# Patient Record
Sex: MALE | Race: WHITE
[De-identification: names, ages, dates, MRNs, and addresses within clinical notes are randomized per-mention and may not be internally consistent; named-entity substitution may affect disease eponyms.]

---

## 2017-07-31 ENCOUNTER — HOSPITAL ENCOUNTER (INPATIENT)
Dept: HOSPITAL 74 - YASAS | Age: 52
LOS: 4 days | Discharge: HOME | DRG: 897 | End: 2017-08-04
Attending: INTERNAL MEDICINE | Admitting: INTERNAL MEDICINE
Payer: COMMERCIAL

## 2017-07-31 VITALS — BODY MASS INDEX: 24.7 KG/M2

## 2017-07-31 DIAGNOSIS — K21.9: ICD-10-CM

## 2017-07-31 DIAGNOSIS — Z87.891: ICD-10-CM

## 2017-07-31 DIAGNOSIS — R74.0: ICD-10-CM

## 2017-07-31 DIAGNOSIS — F10.230: ICD-10-CM

## 2017-07-31 DIAGNOSIS — F19.230: Primary | ICD-10-CM

## 2017-07-31 DIAGNOSIS — R00.0: ICD-10-CM

## 2017-07-31 DIAGNOSIS — F10.282: ICD-10-CM

## 2017-07-31 LAB
APPEARANCE UR: CLEAR
BILIRUB UR STRIP.AUTO-MCNC: NEGATIVE MG/DL
COLOR UR: (no result)
KETONES UR QL STRIP: NEGATIVE
LEUKOCYTE ESTERASE UR QL STRIP.AUTO: NEGATIVE
NITRITE UR QL STRIP: NEGATIVE
PH UR: 7 [PH] (ref 5–8)
PROT UR QL STRIP: NEGATIVE
PROT UR QL STRIP: NEGATIVE
RBC # BLD AUTO: <1 /HPF (ref 0–3)
RBC # UR STRIP: (no result) /UL
SP GR UR: <= 1.005 (ref 1–1.02)
UROBILINOGEN UR STRIP-MCNC: NEGATIVE MG/DL (ref 0.2–1)
WBC # UR AUTO: (no result) /HPF (ref 3–5)

## 2017-07-31 PROCEDURE — HZ2ZZZZ DETOXIFICATION SERVICES FOR SUBSTANCE ABUSE TREATMENT: ICD-10-PCS | Performed by: INTERNAL MEDICINE

## 2017-07-31 RX ADMIN — Medication SCH MG: at 22:19

## 2017-07-31 RX ADMIN — HYDROXYZINE PAMOATE PRN MG: 50 CAPSULE ORAL at 18:28

## 2017-07-31 RX ADMIN — RANITIDINE SCH MG: 150 TABLET ORAL at 22:19

## 2017-07-31 NOTE — CONSULT
[facility] Psychiatric Consult





- Data


Date of interview: 07/31/17


Admission source: [facility]


Identifying data: This is 51 years old Japanese speaking  male with no 

psychiatric hospitalization history, intoxicated with:  Alcohol, Cannabis


Substance Abuse History: Drug Screen Negative: No.  Urine Drug Screen Results: 

THC-Marijuana.  - Smoking Cessation.  Smoking history: Former smoker.  Have you 

smoked in the past 12 months: No.  Aproximately how many cigarettes per day: 1.

  Hx Chewing Tobacco Use: No.  Initiated information on smoking cessation: No.  

- Substance & Tx. History.  Hx Alcohol Use: Yes.  Hx Substance Use: No.  

Substance Use Type: Alcohol.  Hx Substance Use Treatment: Yes ([facility] detox 5 /2016)


Medical History: GERD


Psychiatric History: Patient reports no past psychiatric history


Physical/Sexual Abuse/Trauma History: Denies


Additional Comment: Drug Screen Negative: No.  Urine Drug Screen Results: THC-

Marijuana.  Observation.  Detox Unit Care Protocol





Mental Status Exam





- Mental Status Exam


Alert and Oriented to: Person


Cognitive Function: Fair


Patient Appearance: Unkempt


Mood: Apprehensive


Affect: Mood Congruent


Patient Behavior: Cooperative


Speech Pattern: Appropriate


Voice Loudness: Normal


Thought Process: Goal Oriented


Thought Disorder: Being Controlled


Hallucinations: Denies


Suicidal Ideation: Denies


Homicidal Ideation: Denies


Insight/Judgement: Fair


Sleep: Difficulty falling asleep


Appetite: Fair


Muscle strength/Tone: Normal


Gait/Station: Normal


Additional Comments: Observation.  Detox Unit Care Protocol





Psychiatric Findings





- Problem List (Axis 1, 2,3)


(1) Alcohol dependence with uncomplicated withdrawal


Current Visit: No   Status: Chronic





(2) GERD (gastroesophageal reflux disease)


Current Visit: No   Status: Chronic   Qualifiers: 


     Esophagitis presence: without esophagitis        Qualified Code(s): K21.9 

- Gastro-esophageal reflux disease without esophagitis  





(3) Alcohol-induced sleep disorder


Current Visit: Yes   Status: Acute








- Initial Treatment Plan


Initial Treatment Plan: Observation.  Detox Unit Care Protocol
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
## 2017-08-01 LAB
ALBUMIN SERPL-MCNC: 3.9 G/DL (ref 3.4–5)
ALP SERPL-CCNC: 162 U/L (ref 45–117)
ALT SERPL-CCNC: 105 U/L (ref 12–78)
ANION GAP SERPL CALC-SCNC: 13 MMOL/L (ref 8–16)
AST SERPL-CCNC: 287 U/L (ref 15–37)
BILIRUB SERPL-MCNC: 1 MG/DL (ref 0.2–1)
CALCIUM SERPL-MCNC: 8.5 MG/DL (ref 8.5–10.1)
CO2 SERPL-SCNC: 29 MMOL/L (ref 21–32)
CREAT SERPL-MCNC: 0.7 MG/DL (ref 0.7–1.3)
DEPRECATED RDW RBC AUTO: 16.2 % (ref 11.9–15.9)
GLUCOSE SERPL-MCNC: 123 MG/DL (ref 74–106)
MCH RBC QN AUTO: 30.1 PG (ref 25.7–33.7)
MCHC RBC AUTO-ENTMCNC: 32.6 G/DL (ref 32–35.9)
MCV RBC: 92.1 FL (ref 80–96)
PLATELET # BLD AUTO: 62 K/MM3 (ref 134–434)
PMV BLD: 9.4 FL (ref 7.5–11.1)
PROT SERPL-MCNC: 7.8 G/DL (ref 6.4–8.2)
WBC # BLD AUTO: 4.6 K/MM3 (ref 4–10)

## 2017-08-01 RX ADMIN — POTASSIUM CHLORIDE SCH MEQ: 1500 TABLET, EXTENDED RELEASE ORAL at 14:10

## 2017-08-01 RX ADMIN — RANITIDINE SCH MG: 150 TABLET ORAL at 22:17

## 2017-08-01 RX ADMIN — Medication SCH MG: at 22:17

## 2017-08-01 RX ADMIN — HYDROXYZINE PAMOATE PRN MG: 50 CAPSULE ORAL at 15:06

## 2017-08-01 RX ADMIN — LOPERAMIDE HYDROCHLORIDE PRN MG: 2 CAPSULE ORAL at 22:17

## 2017-08-01 RX ADMIN — RANITIDINE SCH MG: 150 TABLET ORAL at 10:07

## 2017-08-01 RX ADMIN — Medication SCH TAB: at 10:08

## 2017-08-01 RX ADMIN — HYDROXYZINE PAMOATE PRN MG: 50 CAPSULE ORAL at 02:30

## 2017-08-01 NOTE — EKG
Test Reason : 

Blood Pressure : ***/*** mmHG

Vent. Rate : 095 BPM     Atrial Rate : 095 BPM

   P-R Int : 154 ms          QRS Dur : 106 ms

    QT Int : 346 ms       P-R-T Axes : 032 024 059 degrees

   QTc Int : 434 ms

 

NORMAL SINUS RHYTHM

NORMAL ECG

WHEN COMPARED WITH ECG OF 16-JAN-2017 14:17,

NO SIGNIFICANT CHANGE WAS FOUND

Confirmed by SOCORRO FELIPE MD ([id number]) on 8/1/2017 2:24:18 PM

 

Referred By:             Confirmed By:SOCORRO FELIPE MD

## 2017-08-01 NOTE — PN
S CIWA





- CIWA Score


Nausea/Vomitin-No Nausea/No Vomiting


Muscle Tremors: 4-Moderate,w/Arms Extend


Anxiety: 3


Agitation: 4-Moderately Restless


Paroxysmal Sweats: 3


Orientation: 0-Oriented


Tacttile Disturbances: 0-None


Auditory Disturbances: 0-None


Visual Disturbances: 0-None


Headache: 2-Mild


CIWA-Ar Total Score: 16





[facility] Progress Note (SOAP)


Subjective: 


sweats


shakes


nausea


chills


headache


interrupted sleep


Objective: 





17 08:59


 Vital Signs











Temperature  97.7 F   17 06:00


 


Pulse Rate  93 H  17 06:30


 


Respiratory Rate  20   17 06:00


 


Blood Pressure  121/87   17 06:00


 


O2 Sat by Pulse Oximetry (%)      








 Laboratory Tests











  17





  14:00


 


Urine Color  Straw


 


Urine Appearance  Clear


 


Urine pH  7.0


 


Ur Specific Gravity  <= 1.005


 


Urine Protein  Negative


 


Urine Glucose (UA)  Negative


 


Urine Ketones  Negative


 


Urine Blood  1+ H


 


Urine Nitrite  Negative


 


Urine Bilirubin  Negative


 


Urine Urobilinogen  Negative


 


Ur Leukocyte Esterase  Negative


 


Urine RBC  <1


 


Urine WBC  None








labs pending


awake/alert


ambulating


no acute distress


Assessment: 





17 08:59


withdrawal sx


Plan: 


continue detox


increase fluids


labs pending


encouraged librium prn when necessary

## 2017-08-02 LAB
ALBUMIN SERPL-MCNC: 3.6 G/DL (ref 3.4–5)
ALP SERPL-CCNC: 207 U/L (ref 45–117)
ALT SERPL-CCNC: 225 U/L (ref 12–78)
ANION GAP SERPL CALC-SCNC: 8 MMOL/L (ref 8–16)
AST SERPL-CCNC: 676 U/L (ref 15–37)
BILIRUB SERPL-MCNC: 1.4 MG/DL (ref 0.2–1)
CALCIUM SERPL-MCNC: 9 MG/DL (ref 8.5–10.1)
CO2 SERPL-SCNC: 33 MMOL/L (ref 21–32)
CREAT SERPL-MCNC: 0.7 MG/DL (ref 0.7–1.3)
GLUCOSE SERPL-MCNC: 97 MG/DL (ref 74–106)
PROT SERPL-MCNC: 7.6 G/DL (ref 6.4–8.2)

## 2017-08-02 RX ADMIN — RANITIDINE SCH MG: 150 TABLET ORAL at 22:42

## 2017-08-02 RX ADMIN — POTASSIUM CHLORIDE SCH MEQ: 1500 TABLET, EXTENDED RELEASE ORAL at 10:17

## 2017-08-02 RX ADMIN — Medication SCH MG: at 22:42

## 2017-08-02 RX ADMIN — HYDROXYZINE PAMOATE PRN MG: 50 CAPSULE ORAL at 18:22

## 2017-08-02 RX ADMIN — QUETIAPINE FUMARATE SCH MG: 100 TABLET ORAL at 22:42

## 2017-08-02 RX ADMIN — Medication SCH TAB: at 10:17

## 2017-08-02 RX ADMIN — BACITRACIN ZINC SCH GM: 500 OINTMENT TOPICAL at 10:17

## 2017-08-02 RX ADMIN — LOPERAMIDE HYDROCHLORIDE PRN MG: 2 CAPSULE ORAL at 06:12

## 2017-08-02 RX ADMIN — IBUPROFEN PRN MG: 400 TABLET, FILM COATED ORAL at 16:21

## 2017-08-02 RX ADMIN — RANITIDINE SCH MG: 150 TABLET ORAL at 10:17

## 2017-08-02 RX ADMIN — HYDROXYZINE PAMOATE PRN MG: 50 CAPSULE ORAL at 14:13

## 2017-08-02 RX ADMIN — HYDROXYZINE PAMOATE PRN MG: 50 CAPSULE ORAL at 08:53

## 2017-08-02 NOTE — PN
Psychiatric Progress Note


Vital Signs: 


 Vital Signs











 Period  Temp  Pulse  Resp  BP Sys/Green  Pulse Ox


 


 Last 24 Hr  97.7 F-98.2 F    18-20  110-139/80-96  











Date of Session: 08/02/17


Chief Complaint:: Insomnia


HPI: Patient reports insomnia, reports good response on Seroquel 100mg po qhs


Current Medications: 


Active Medications











Generic Name Dose Route Start Last Admin





  Trade Name Freq  PRN Reason Stop Dose Admin


 


Al Hydroxide/Mg Hydroxide  30 ml 07/31/17 12:30  





  Mylanta Oral Suspension -  PO   





  Q6H PRN   





  DYSPEPSIA   


 


Artificial Tears  1 drop 08/01/17 13:30  





  Artificial Tears  OU   





  Q6H PRN   





  DRY EYES   


 


Bacitracin  0.9 gm 08/02/17 10:00 08/02/17 10:17





  Bacitracin -  TP   0.9 gm





  DAILY JAIME   Administration


 


Chlordiazepoxide HCl  10 mg 08/03/17 17:00  





  Librium -  PO 08/04/17 11:01  





  [id number] JAIME   


 


Chlordiazepoxide HCl  25 mg 07/31/17 12:30 08/02/17 01:09





  Librium -  PO 08/03/17 12:29  25 mg





  Q4H PRN   Administration





  WITHDRAWAL(CONT SUBST)   


 


Chlordiazepoxide HCl  15 mg 08/02/17 17:00  





  Librium -  PO 08/03/17 11:01  





  [id number] JAIME   


 


Diphenhydramine HCl  50 mg 07/31/17 12:30 08/02/17 00:31





  Benadryl -  PO   50 mg





  HSMR1 PRN   Administration





  INSOMNIA   


 


Eucalyptus/Menthol/Phenol/Sorbitol  1 each 07/31/17 12:30  





  Cepastat Lozenge -  MM   





  Q4H PRN   





  SORE THROAT   


 


Guaifenesin  10 ml 07/31/17 12:30  





  Robitussin Dm -  PO   





  Q6H PRN   





  COUGH   


 


Hydroxyzine Pamoate  50 mg 07/31/17 12:30 08/02/17 08:53





  Vistaril -  PO   50 mg





  Q4H PRN   Administration





  AGITATION   


 


Ibuprofen  400 mg 07/31/17 12:30  





  Motrin -  PO   





  Q6H PRN   





  SEVERE PAIN   


 


Loperamide HCl  4 mg 07/31/17 12:30 08/02/17 06:12





  Imodium -  PO   4 mg





  Q6H PRN   Administration





  DIARRHEA   


 


Magnesium Citrate  300 ml 07/31/17 12:30  





  Citroma -  PO   





  Q48H PRN   





  CONSTIPATION   


 


Magnesium Hydroxide  30 ml 07/31/17 12:30  





  Milk Of Magnesia -  PO   





  DAILY PRN   





  CONSTIPATION   


 


Potassium Chloride  40 meq 08/01/17 13:45 08/02/17 10:17





  K-Dur -  PO   40 meq





  DAILY JAIME   Administration


 


Prenatal Multivit/Folic Acid/Iron  1 tab 08/01/17 10:00 08/02/17 10:17





  Prenatal Vitamins (Sjr) -  PO   1 tab





  DAILY JAIME   Administration


 


Pseudoephedrine/Triprolidine  1 combo 07/31/17 12:30  





  Actifed -  PO   





  TID PRN   





  NASAL CONGESTION   


 


Quetiapine Fumarate  100 mg 08/02/17 22:00  





  Seroquel -  PO   





  HS JAIME   


 


Ranitidine HCl  150 mg 07/31/17 22:00 08/02/17 10:17





  Zantac -  PO   150 mg





  BID JAIME   Administration


 


Thiamine HCl  100 mg 07/31/17 22:00 08/01/17 22:17





  Vitamin B1 -  PO   100 mg





  HS JAIME   Administration














Mental Status Exam





- Mental Status Exam


Alert and Oriented to: Person


Cognitive Function: Fair


Patient Appearance: Well Groomed


Mood: Anxious


Affect: Mood Congruent


Patient Behavior: Talkative, Agitated


Speech Pattern: Excessive


Voice Loudness: Mildly Loud


Thought Process: Goal Oriented


Thought Disorder: Being Controlled


Hallucinations: Denies


Suicidal Ideation: Denies


Homicidal Ideation: Denies


Insight/Judgement: Fair


Sleep: Difficulty falling asleep


Appetite: Fair


Muscle strength/Tone: Normal


Gait/Station: Normal


Additional Comments: Seroquel 100mg po qhs





Psychiatric Treatment Plan





- Problem List


(1) Alcohol dependence with uncomplicated withdrawal


Current Visit: Yes





(2) GERD (gastroesophageal reflux disease)


Current Visit: Yes   Qualifiers: 


     Esophagitis presence: without esophagitis        Qualified Code(s): K21.9 

- Gastro-esophageal reflux disease without esophagitis  





(3) Alcohol-induced sleep disorder


Current Visit: Yes


Initial treatment plan: Seroquel 100mg po qhs

## 2017-08-02 NOTE — PN
[facility] CIWA





- CIWA Score


Nausea/Vomitin-No Nausea/No Vomiting


Muscle Tremors: 4-Moderate,w/Arms Extend


Anxiety: 3


Agitation: 4-Moderately Restless


Paroxysmal Sweats: 3


Orientation: 0-Oriented


Tacttile Disturbances: 0-None


Auditory Disturbances: 0-None


Visual Disturbances: 0-None


Headache: 0-None Present


CIWA-Ar Total Score: 14





[facility] Progress Note (SOAP)


Subjective: 


anxious


agitation


interrupted sleep


irritable


i would like my sutures removed


Objective: 


17 09:06


 Vital Signs











Temperature  97.7 F   17 06:28


 


Pulse Rate  109 H  17 06:28


 


Respiratory Rate  18   17 06:28


 


Blood Pressure  130/94   17 06:28


 


O2 Sat by Pulse Oximetry (%)      























 Laboratory Tests











  17





  14:00 06:00 06:00


 


WBC   4.6 


 


RBC   4.45 


 


Hgb   13.4 


 


Hct   40.9 


 


MCV   92.1 


 


MCH   30.1 


 


MCHC   32.6 


 


RDW   16.2 H 


 


Plt Count   62 L D 


 


MPV   9.4 


 


Sodium    138


 


Potassium    3.4 L


 


Chloride    96 L


 


Carbon Dioxide    29


 


Anion Gap    13


 


BUN    4 L D


 


Creatinine    0.7


 


Creat Clearance w eGFR    > 60


 


Random Glucose    123 H D


 


Calcium    8.5


 


Total Bilirubin    1.0


 


AST    287 H D


 


ALT    105 H


 


Alkaline Phosphatase    162 H D


 


Total Protein    7.8


 


Albumin    3.9


 


Urine Color  Straw  


 


Urine Appearance  Clear  


 


Urine pH  7.0  


 


Ur Specific Gravity  <= 1.005  


 


Urine Protein  Negative  


 


Urine Glucose (UA)  Negative  


 


Urine Ketones  Negative  


 


Urine Blood  1+ H  


 


Urine Nitrite  Negative  


 


Urine Bilirubin  Negative  


 


Urine Urobilinogen  Negative  


 


Ur Leukocyte Esterase  Negative  


 


Urine RBC  <1  


 


Urine WBC  None  


 


RPR Titer   














  17





  06:00


 


WBC 


 


RBC 


 


Hgb 


 


Hct 


 


MCV 


 


MCH 


 


MCHC 


 


RDW 


 


Plt Count 


 


MPV 


 


Sodium 


 


Potassium 


 


Chloride 


 


Carbon Dioxide 


 


Anion Gap 


 


BUN 


 


Creatinine 


 


Creat Clearance w eGFR 


 


Random Glucose 


 


Calcium 


 


Total Bilirubin 


 


AST 


 


ALT 


 


Alkaline Phosphatase 


 


Total Protein 


 


Albumin 


 


Urine Color 


 


Urine Appearance 


 


Urine pH 


 


Ur Specific Gravity 


 


Urine Protein 


 


Urine Glucose (UA) 


 


Urine Ketones 


 


Urine Blood 


 


Urine Nitrite 


 


Urine Bilirubin 


 


Urine Urobilinogen 


 


Ur Leukocyte Esterase 


 


Urine RBC 


 


Urine WBC 


 


RPR Titer  Nonreactive














awake/alert


ambulating


no acute distress


Assessment: 


17 10:03


withdrawal sx


suture site; clean, dry and healed. no s/s of infection


Plan: 


continue detox


increase fluids


vistirl prn


sutures removed; bacitracin ordered.

## 2017-08-03 LAB
ALT SERPL-CCNC: 215 U/L (ref 12–78)
AST SERPL-CCNC: 364 U/L (ref 15–37)
INR BLD: 0.98 (ref 0.82–1.09)
PT PNL PPP: 10.8 SEC (ref 9.98–11.88)

## 2017-08-03 RX ADMIN — Medication SCH TAB: at 11:04

## 2017-08-03 RX ADMIN — LACTULOSE SCH GM: 20 SOLUTION ORAL at 14:05

## 2017-08-03 RX ADMIN — RANITIDINE SCH MG: 150 TABLET ORAL at 22:31

## 2017-08-03 RX ADMIN — Medication SCH MG: at 22:31

## 2017-08-03 RX ADMIN — QUETIAPINE FUMARATE SCH MG: 100 TABLET ORAL at 22:31

## 2017-08-03 RX ADMIN — HYDROXYZINE PAMOATE PRN MG: 50 CAPSULE ORAL at 11:05

## 2017-08-03 RX ADMIN — IBUPROFEN PRN MG: 400 TABLET, FILM COATED ORAL at 13:54

## 2017-08-03 RX ADMIN — BACITRACIN ZINC SCH GM: 500 OINTMENT TOPICAL at 11:04

## 2017-08-03 RX ADMIN — LACTULOSE SCH GM: 20 SOLUTION ORAL at 17:19

## 2017-08-03 RX ADMIN — POLYVINYL ALCOHOL PRN DROP: 14 SOLUTION/ DROPS OPHTHALMIC at 22:32

## 2017-08-03 RX ADMIN — RANITIDINE SCH MG: 150 TABLET ORAL at 11:04

## 2017-08-03 RX ADMIN — POTASSIUM CHLORIDE SCH MEQ: 1500 TABLET, EXTENDED RELEASE ORAL at 11:04

## 2017-08-03 RX ADMIN — HYDROXYZINE PAMOATE PRN MG: 50 CAPSULE ORAL at 17:22

## 2017-08-03 RX ADMIN — LACTULOSE SCH GM: 20 SOLUTION ORAL at 22:32

## 2017-08-03 NOTE — PN
[facility] Progress Note (SOAP)


Subjective: 


anxiety


sweats


irritable


interrupted sleep


Objective: 


08/03/17 08:36


 Vital Signs











Temperature  98.1 F   08/03/17 06:14


 


Pulse Rate  89   08/03/17 06:14


 


Respiratory Rate  18   08/03/17 06:14


 


Blood Pressure  127/82   08/03/17 06:14


 


O2 Sat by Pulse Oximetry (%)      








 Laboratory Tests











  07/31/17 08/01/17 08/01/17





  14:00 06:00 06:00


 


WBC   4.6 


 


RBC   4.45 


 


Hgb   13.4 


 


Hct   40.9 


 


MCV   92.1 


 


MCH   30.1 


 


MCHC   32.6 


 


RDW   16.2 H 


 


Plt Count   62 L D 


 


MPV   9.4 


 


Sodium    138


 


Potassium    3.4 L


 


Chloride    96 L


 


Carbon Dioxide    29


 


Anion Gap    13


 


BUN    4 L D


 


Creatinine    0.7


 


Creat Clearance w eGFR    > 60


 


Random Glucose    123 H D


 


Calcium    8.5


 


Total Bilirubin    1.0


 


AST    287 H D


 


ALT    105 H


 


Alkaline Phosphatase    162 H D


 


Total Protein    7.8


 


Albumin    3.9


 


Urine Color  Straw  


 


Urine Appearance  Clear  


 


Urine pH  7.0  


 


Ur Specific Gravity  <= 1.005  


 


Urine Protein  Negative  


 


Urine Glucose (UA)  Negative  


 


Urine Ketones  Negative  


 


Urine Blood  1+ H  


 


Urine Nitrite  Negative  


 


Urine Bilirubin  Negative  


 


Urine Urobilinogen  Negative  


 


Ur Leukocyte Esterase  Negative  


 


Urine RBC  <1  


 


Urine WBC  None  


 


RPR Titer   














  08/01/17 08/02/17





  06:00 06:30


 


WBC  


 


RBC  


 


Hgb  


 


Hct  


 


MCV  


 


MCH  


 


MCHC  


 


RDW  


 


Plt Count  


 


MPV  


 


Sodium   138


 


Potassium   3.7


 


Chloride   97 L


 


Carbon Dioxide   33 H


 


Anion Gap   8


 


BUN   6 L D


 


Creatinine   0.7


 


Creat Clearance w eGFR   > 60


 


Random Glucose   97  D


 


Calcium   9.0


 


Total Bilirubin   1.4 H D


 


AST   676 H D


 


ALT   225 H D


 


Alkaline Phosphatase   207 H D


 


Total Protein   7.6


 


Albumin   3.6


 


Urine Color  


 


Urine Appearance  


 


Urine pH  


 


Ur Specific Gravity  


 


Urine Protein  


 


Urine Glucose (UA)  


 


Urine Ketones  


 


Urine Blood  


 


Urine Nitrite  


 


Urine Bilirubin  


 


Urine Urobilinogen  


 


Ur Leukocyte Esterase  


 


Urine RBC  


 


Urine WBC  


 


RPR Titer  Nonreactive 








elevated ast/alt


pt appears with little confusion as to how he arrived to the unit. however, pt 

is aware of time, person. 


ammonia level ordered


repeated ast/alt/inr labs


Assessment: 





08/03/17 10:36


withdrawal sx


Plan: 


continue detox


increase fluids


f/u pending labs


d/c for tomorrow is pending on labs and mental orientation

## 2017-08-04 VITALS — HEART RATE: 102 BPM | SYSTOLIC BLOOD PRESSURE: 129 MMHG | DIASTOLIC BLOOD PRESSURE: 94 MMHG | TEMPERATURE: 97.9 F

## 2017-08-04 RX ADMIN — LACTULOSE SCH GM: 20 SOLUTION ORAL at 10:19

## 2017-08-04 RX ADMIN — RANITIDINE SCH MG: 150 TABLET ORAL at 10:18

## 2017-08-04 RX ADMIN — HYDROXYZINE PAMOATE PRN MG: 50 CAPSULE ORAL at 09:18

## 2017-08-04 RX ADMIN — BACITRACIN ZINC SCH GM: 500 OINTMENT TOPICAL at 10:18

## 2017-08-04 RX ADMIN — Medication SCH TAB: at 10:18

## 2017-08-04 RX ADMIN — POTASSIUM CHLORIDE SCH MEQ: 1500 TABLET, EXTENDED RELEASE ORAL at 10:18

## 2017-08-04 RX ADMIN — POLYVINYL ALCOHOL PRN DROP: 14 SOLUTION/ DROPS OPHTHALMIC at 09:14

## 2017-08-04 NOTE — DS
[facility] Detox Discharge Summary


Admission Date: 


07/31/17





Discharge Date: 08/04/17





- History


Present History: Alcohol Dependence





- Physical Exam Results


Vital Signs: 


 Vital Signs











Temperature  97.7 F   08/04/17 06:31


 


Pulse Rate  83   08/04/17 06:31


 


Respiratory Rate  16   08/04/17 06:31


 


Blood Pressure  108/75   08/04/17 06:31


 


O2 Sat by Pulse Oximetry (%)      














- Treatment


Hospital Course: Detox Protocol Followed, Detoxed Safely, Responded well, 

Discharged Condition Good, Rehab Referral Accepted





- Medication


Discharge Medications: 


Ambulatory Orders





Potassium Chloride [K-Dur -] 40 meq PO DAILY #3 tab 08/02/17 


Quetiapine Fumarate [Seroquel] 100 mg PO HS #30 tablet 08/02/17 


Quetiapine Fumarate [Seroquel] 100 mg PO HS #30 tablet 08/02/17 











- Diagnosis


(1) Alcohol dependence with uncomplicated withdrawal


Current Visit: Yes   Status: Chronic





(2) GERD (gastroesophageal reflux disease)


Current Visit: Yes   Status: Chronic   Qualifiers: 


     Esophagitis presence: without esophagitis        Qualified Code(s): K21.9 

- Gastro-esophageal reflux disease without esophagitis  








- AMA


Did Patient Leave Against Medical Advice: No (d/c to home)

## 2017-08-04 NOTE — PN
[facility] Progress Note


Note: 


pt appears much more lucid and states he feels so much better. Pt was advised 

that his liver enzymes are improving slowly and ammonia level will improve with 

continued lactulose. Pt in agreement.

## 2018-09-20 ENCOUNTER — HOSPITAL ENCOUNTER (INPATIENT)
Dept: HOSPITAL 74 - YASAS | Age: 53
LOS: 1 days | Discharge: LEFT BEFORE DISCHARGE | DRG: 894 | End: 2018-09-21
Attending: INTERNAL MEDICINE | Admitting: INTERNAL MEDICINE
Payer: COMMERCIAL

## 2018-09-20 VITALS — BODY MASS INDEX: 26.6 KG/M2

## 2018-09-20 DIAGNOSIS — K21.9: ICD-10-CM

## 2018-09-20 DIAGNOSIS — G47.00: ICD-10-CM

## 2018-09-20 DIAGNOSIS — F12.20: ICD-10-CM

## 2018-09-20 DIAGNOSIS — F10.230: Primary | ICD-10-CM

## 2018-09-20 DIAGNOSIS — F32.9: ICD-10-CM

## 2018-09-20 PROCEDURE — HZ2ZZZZ DETOXIFICATION SERVICES FOR SUBSTANCE ABUSE TREATMENT: ICD-10-PCS | Performed by: INTERNAL MEDICINE

## 2018-09-20 NOTE — HP
CIWA Score





- CIWA Score


Nausea/Vomitin-No Nausea/No Vomiting


Muscle Tremors: 2


Anxiety: 3


Agitation: 5


Paroxysmal Sweats: 3


Orientation: 1-Uncertain about Date (no distress)


Tacttile Disturbances: 0-None


Auditory Disturbances: 0-None


Visual Disturbances: 1-Very Mild Sensitivity


Headache: 0-None Present


CIWA-Ar Total Score: 15





Admission ROS [facility]





- HPI


Chief Complaint: 





" I have the shakes, I need help"


alcohol withdrawal symptoms 


Allergies/Adverse Reactions: 


 Allergies











Allergy/AdvReac Type Severity Reaction Status Date / Time


 


No Known Allergies Allergy   Verified 18 18:27











History of Present Illness: 





53 yo male with hx of chronic alcohol and marijuana dependence. Last detox [facility] 

18 -18 left AMA, elapsed soon after.  PMHX: GERD, depression, insomnia

, and anxiety. Denies  suicidal / homicidal ideation.   Longest period of 

sobriety one year. Denies hx of blackouts or seizures. 


Exam Limitations: No Limitations





- Ebola screening


Have you traveled outside of the country in the last 21 days: No


Have you had contact with anyone from an Ebola affected area: No


Have you been sick,other than usual withdrawal symptoms: No





- Review of Systems


Constitutional: Loss of Appetite, Changes in sleep, Other (anxious)


EENT: reports: Dental Problems (missing teeth)


Respiratory: reports: No Symptoms reported


Cardiac: reports: No Symptoms Reported


GI: reports: Diarrhea, Poor Appetite, Poor Fluid Intake, Indigestion


: reports: No Symptoms Reported


Musculoskeletal: reports: No Symptoms Reported


Integumentary: reports: No Symptoms Reported


Neuro: reports: No Symptoms reported


Endocrine: reports: Increased Thirst


Hematology: reports: No Symptoms Reported


Psychiatric: reports: Orientated x3, Anxious, Depressed


Other Systems: Reviewed and Negative





Patient History





- Patient Medical History


Hx Anemia: No


Hx Asthma: No


Hx Chronic Obstructive Pulmonary Disease (COPD): No


Hx Cancer: No


Hx Cardiac Disorders: No


Hx Congestive Heart Failure: No


Hx Hypertension: No


Hx Hypercholesterolemia: No


Hx Pacemaker: No


HX Cerebrovascular Accident: No


Hx Seizures: No


Hx Dementia: No


Hx Diabetes: No


Hx Gastrointestinal Disorders: No


Hx Liver Disease: No


Hx Genitourinary Disorders: No


Hx Sexually Transmitted Disorders: No


Hx Renal Disease (ESRD): No


Hx Thyroid Disease: No


Hx Human Immunodeficiency Virus (HIV): No (negative)


Hx Hepatitis C: No


Hx Depression: Yes


Hx Suicide Attempt: No


Hx Bipolar Disorder: No


Hx Schizophrenia: No





- Patient Surgical History


Past Surgical History: Yes


Hx Neurologic Surgery: No


Hx Cataract Extraction: No


Hx Cardiac Surgery: No


Hx Lung Surgery: No


Hx Breast Surgery: No


Hx Breast Biopsy: No


Hx Abdominal Surgery: No


Hx Appendectomy: No


Hx Cholecystectomy: No


Hx Genitourinary Surgery: No


Hx  Section: No


Hx Orthopedic Surgery: Yes (left elbow 2016)


Other Surgical History: fracture of rt shoulder


Anesthesia Reaction: No





- PPD History


Previous Implant?: Yes


Documented Results: Negative w/proof


Date: 18


Results: 0 mm


PPD to be Administered?: No





- Smoking Cessation


Smoking history: Former smoker


Have you smoked in the past 12 months: No


Aproximately how many cigarettes per day: 1


If you are a former smoker, when did you quit?: 


Hx Chewing Tobacco Use: No


Initiated information on smoking cessation: No


'Breaking Loose' booklet given: 18





- Substance & Tx. History


Hx Alcohol Use: Yes


Hx Substance Use: Yes


Substance Use Type: Alcohol


Hx Substance Use Treatment: Yes (Last detox [facility] 18 -18 left AMA)





- Substances Abused


  ** Alcohol


Route: Oral


Frequency: Daily


Amount used: beer- 3 six pack


Age of first use: 15


Date of Last Use: 18





  ** Marijuana/Hashish


Route: Smoking


Frequency: Daily


Amount used: 3 blunts


Age of first use: 15


Date of Last Use: 18





Family Disease History





- Family Disease History


Family Disease History: Diabetes: Mother, [place]: Father (prostate )





Admission Physical Exam [facility]





- Vital Signs


Vital Signs: 


 Vital Signs - 24 hr











  18





  15:30


 


Temperature 97 F L


 


Pulse Rate 91 H


 


Respiratory 18





Rate 


 


Blood Pressure 132/87














- Physical


General Appearance: Yes: Disheveled, Moderate Distress, Thin, Sweating, Anxious

, Other (restless)


HEENTM: Yes: Hearing grossly Normal, Normal ENT Inspection, Normocephalic, 

Normal Voice, BERNARDINO, Pharynx Normal, Tm's normal, Other (cheilithis)


Respiratory: Yes: Chest Non-Tender, Lungs Clear, Normal Breath Sounds, No 

Respiratory Distress, No Accessory Muscle Use


Neck: Yes: Within Normal Limits


Breast: Yes: Breast Exam Deferred


Cardiology: Yes: Regular Rhythm, Regular Rate


Abdominal: Yes: Normal Bowel Sounds, Non Tender, Flat, Soft


Genitourinary: Yes: Within Normal Limits


Back: Yes: Normal Inspection


Musculoskeletal: Yes: full range of Motion, Gait Steady, Pelvis Stable


Extremities: Yes: Normal Capillary Refill, Normal Range of Motion


Neurological: Yes: CNs II-XII NML intact, Fully Oriented, Alert, Motor Strength 

5/5, Depressed Affect


Integumentary: Yes: Normal Color, Warm, Diaphoresis


Lymphatic: Yes: Within Normal Limits





- Diagnostic


(1) Alcohol dependence with uncomplicated withdrawal


Current Visit: Yes   Status: Acute   





(2) Cannabis dependence


Current Visit: Yes   Status: Acute   





(3) GERD (gastroesophageal reflux disease)


Current Visit: Yes   Status: Chronic   


Qualifiers: 


   Esophagitis presence: without esophagitis   Qualified Code(s): K21.9 - Gastro

-esophageal reflux disease without esophagitis   





(4) Depression


Current Visit: Yes   Status: Suspected   


Qualifiers: 


   Depression Type: unspecified   Qualified Code(s): F32.9 - Major depressive 

disorder, single episode, unspecified   





Cleared for Admission [facility]





- Detox or Rehab


[facility] Level of Care: Medically Managed


Detox Regimen/Protocol: Librium





[facility] Breath Alcohol Content


Breath Alcohol Content: 0.330





Urine Drug Screen





- Results


Drug Screen Negative: No


Urine Drug Screen Results: THC-Marijuana

## 2018-09-21 VITALS — HEART RATE: 93 BPM | DIASTOLIC BLOOD PRESSURE: 79 MMHG | TEMPERATURE: 98.2 F | SYSTOLIC BLOOD PRESSURE: 113 MMHG

## 2018-09-21 LAB
ALBUMIN SERPL-MCNC: 3.4 G/DL (ref 3.4–5)
ALP SERPL-CCNC: 71 U/L (ref 45–117)
ALT SERPL-CCNC: 20 U/L (ref 13–61)
ANION GAP SERPL CALC-SCNC: 10 MMOL/L (ref 8–16)
APPEARANCE UR: CLEAR
AST SERPL-CCNC: 25 U/L (ref 15–37)
BILIRUB SERPL-MCNC: 0.4 MG/DL (ref 0.2–1)
BILIRUB UR STRIP.AUTO-MCNC: NEGATIVE MG/DL
BUN SERPL-MCNC: 9 MG/DL (ref 7–18)
CALCIUM SERPL-MCNC: 8.5 MG/DL (ref 8.5–10.1)
CHLORIDE SERPL-SCNC: 100 MMOL/L (ref 98–107)
CO2 SERPL-SCNC: 29 MMOL/L (ref 21–32)
COLOR UR: COLORLESS
CREAT SERPL-MCNC: 0.6 MG/DL (ref 0.55–1.3)
DEPRECATED RDW RBC AUTO: 14.7 % (ref 11.9–15.9)
GLUCOSE SERPL-MCNC: 83 MG/DL (ref 74–106)
HCT VFR BLD CALC: 39.3 % (ref 35.4–49)
HGB BLD-MCNC: 12.9 GM/DL (ref 11.7–16.9)
KETONES UR QL STRIP: NEGATIVE
LEUKOCYTE ESTERASE UR QL STRIP.AUTO: NEGATIVE
MCH RBC QN AUTO: 29.3 PG (ref 25.7–33.7)
MCHC RBC AUTO-ENTMCNC: 32.9 G/DL (ref 32–35.9)
MCV RBC: 89.3 FL (ref 80–96)
NITRITE UR QL STRIP: NEGATIVE
PH UR: 7 [PH] (ref 5–8)
PLATELET # BLD AUTO: 181 K/MM3 (ref 134–434)
PMV BLD: 8.7 FL (ref 7.5–11.1)
POTASSIUM SERPLBLD-SCNC: 3.8 MMOL/L (ref 3.5–5.1)
PROT SERPL-MCNC: 7 G/DL (ref 6.4–8.2)
PROT UR QL STRIP: NEGATIVE
PROT UR QL STRIP: NEGATIVE
RBC # BLD AUTO: 4.4 M/MM3 (ref 4–5.6)
SODIUM SERPL-SCNC: 139 MMOL/L (ref 136–145)
SP GR UR: 1 (ref 1–1.03)
UROBILINOGEN UR STRIP-MCNC: NEGATIVE MG/DL (ref 0.2–1)
WBC # BLD AUTO: 4.8 K/MM3 (ref 4–10)

## 2018-09-21 NOTE — DS
[facility] Detox Discharge Summary


Admission Date: 


09/20/18





Discharge Date: 09/21/18





- History


Present History: Alcohol Dependence


Additional Comments: 





PT DECLINED TO CONTINUE WITH DETOX FOR PERSONAL REASONS. ALERT O X 3. NAD.


Pertinent Past History: 





PLEASE SEE DX BELOW





- Physical Exam Results


Vital Signs: 


 Vital Signs











Temperature  98.2 F   09/21/18 10:17


 


Pulse Rate  93 H  09/21/18 10:17


 


Respiratory Rate  18   09/21/18 10:17


 


Blood Pressure  113/79   09/21/18 10:17


 


O2 Sat by Pulse Oximetry (%)      











Pertinent Admission Physical Exam Findings: 





WITHDRAWAL SX


 Laboratory Tests











  09/20/18 09/21/18 09/21/18





  23:27 07:00 07:00


 


WBC   4.8 


 


RBC   4.40 


 


Hgb   12.9 


 


Hct   39.3 


 


MCV   89.3 


 


MCH   29.3 


 


MCHC   32.9 


 


RDW   14.7 


 


Plt Count   181 


 


MPV   8.7  D 


 


Sodium    139


 


Potassium    3.8


 


Chloride    100


 


Carbon Dioxide    29


 


Anion Gap    10


 


BUN    9


 


Creatinine    0.6


 


Creat Clearance w eGFR    > 60


 


Random Glucose    83


 


Calcium    8.5


 


Total Bilirubin    0.4


 


AST    25


 


ALT    20


 


Alkaline Phosphatase    71


 


Total Protein    7.0


 


Albumin    3.4


 


Urine Color  Colorless  


 


Urine Appearance  Clear  


 


Urine pH  7.0  D  


 


Ur Specific Gravity  1.002  


 


Urine Protein  Negative  


 


Urine Glucose (UA)  Negative  


 


Urine Ketones  Negative  


 


Urine Blood  Negative  


 


Urine Nitrite  Negative  


 


Urine Bilirubin  Negative  


 


Urine Urobilinogen  Negative  


 


Ur Leukocyte Esterase  Negative  














- Treatment


Hospital Course: Discharged Condition Good





- Medication


Discharge Medications: 


Ambulatory Orders





NK [No Known Home Medication]  07/31/18 











- Diagnosis


(1) Alcohol dependence with uncomplicated withdrawal


Current Visit: Yes   Status: Acute   





(2) Cannabis dependence


Current Visit: Yes   Status: Acute   





(3) Insomnia


Current Visit: Yes   Status: Acute   


Qualifiers: 


   Insomnia type: unspecified   Qualified Code(s): G47.00 - Insomnia, 

unspecified   





- AMA


Did Patient Leave Against Medical Advice: Yes (AMA)

## 2018-09-21 NOTE — EKG
Test Reason : 

Blood Pressure : ***/*** mmHG

Vent. Rate : 085 BPM     Atrial Rate : 085 BPM

   P-R Int : 158 ms          QRS Dur : 094 ms

    QT Int : 340 ms       P-R-T Axes : 049 035 056 degrees

   QTc Int : 404 ms

 

NORMAL SINUS RHYTHM

NORMAL ECG

WHEN COMPARED WITH ECG OF 31-JUL-2018 10:00,

NO SIGNIFICANT CHANGE WAS FOUND

Confirmed by ROMEO MOLINA MD ([id number]) on 9/21/2018 8:00:52 AM

 

Referred By:             Confirmed By:ROMEO MOLINA MD

## 2018-09-21 NOTE — PN
[facility] CIWA





- CIWA Score


Nausea/Vomitin-No Nausea/No Vomiting


Muscle Tremors: 4-Moderate,w/Arms Extend


Anxiety: 4-Mod. Anxious/Guarded


Agitation: 4-Moderately Restless


Paroxysmal Sweats: 1-Minimal Palms Moist


Orientation: 0-Oriented


Tacttile Disturbances: 0-None


Auditory Disturbances: 0-None


Visual Disturbances: 0-None


Headache: 0-None Present


CIWA-Ar Total Score: 13

## 2018-09-21 NOTE — PN
[facility] Progress Note


Note: 





Psychiatric nurse practitioner note: 


Writer informed by staff that patient left AMA. Unable to complete psychiatric 

consultation.

## 2018-11-18 ENCOUNTER — HOSPITAL ENCOUNTER (INPATIENT)
Dept: HOSPITAL 74 - YASAS | Age: 53
LOS: 1 days | Discharge: LEFT BEFORE DISCHARGE | DRG: 894 | End: 2018-11-19
Attending: INTERNAL MEDICINE | Admitting: INTERNAL MEDICINE
Payer: COMMERCIAL

## 2018-11-18 VITALS — BODY MASS INDEX: 28.3 KG/M2

## 2018-11-18 DIAGNOSIS — G47.00: ICD-10-CM

## 2018-11-18 DIAGNOSIS — F12.20: ICD-10-CM

## 2018-11-18 DIAGNOSIS — F32.9: ICD-10-CM

## 2018-11-18 DIAGNOSIS — K21.9: ICD-10-CM

## 2018-11-18 DIAGNOSIS — F10.282: ICD-10-CM

## 2018-11-18 DIAGNOSIS — F10.230: Primary | ICD-10-CM

## 2018-11-18 LAB
APPEARANCE UR: CLEAR
BILIRUB UR STRIP.AUTO-MCNC: NEGATIVE MG/DL
COLOR UR: COLORLESS
KETONES UR QL STRIP: NEGATIVE
LEUKOCYTE ESTERASE UR QL STRIP.AUTO: NEGATIVE
NITRITE UR QL STRIP: NEGATIVE
PH UR: 6 [PH] (ref 5–8)
PROT UR QL STRIP: NEGATIVE
PROT UR QL STRIP: NEGATIVE
SP GR UR: 1 (ref 1.01–1.03)
UROBILINOGEN UR STRIP-MCNC: NEGATIVE MG/DL (ref 0.2–1)

## 2018-11-18 PROCEDURE — HZ2ZZZZ DETOXIFICATION SERVICES FOR SUBSTANCE ABUSE TREATMENT: ICD-10-PCS | Performed by: INTERNAL MEDICINE

## 2018-11-18 NOTE — HP
CIWA Score


Nausea/Vomiting: 3


Muscle Tremors: 4-Moderate,w/Arms Extend


Anxiety: 4-Mod. Anxious/Guarded


Agitation: 4-Moderately Restless


Paroxysmal Sweats: 3


Orientation: 0-Oriented


Tacttile Disturbances: 0-None


Auditory Disturbances: 0-None


Visual Disturbances: 0-None


Headache: 1-Very Mild


CIWA-Ar Total Score: 19





- Admission Criteria


[facility] Guidelines: Admission for Medically Managed Detox: 


Requires at least one of the followin. CIWA greater than 12


2. Seizures within the past 24 hours


3. Delirium tremens within the past 24 hours


4. Hallucinations within the past 24 hours


5. Acute intervention needed for co  occurring medical disorder


6. Acute intervention needed for co  occurring psychiatric disorder


7. Severe withdrawal that cannot be handled at a lower level of care (continued


    vomiting, continued diarrhea, abnormal vital signs) requiring intravenous


    medication and/or fluids


8. Pregnancy





Patient presents the following: CIWA greater than 12


Admission Criteria Met: Admission criteria met





Admission ROS S





- HPI


Chief Complaint: 





"I want to clean my living, I need help to stop"


Allergies/Adverse Reactions: 


 Allergies











Allergy/AdvReac Type Severity Reaction Status Date / Time


 


No Known Allergies Allergy   Verified 18 13:32











History of Present Illness: 





53 y/o with a long hx of alcohol addiction presents  requesting  detox.





LAst drink was today, no remarkable sober period.


Pt was last here in Sept but signed out AMA after a day.


Pt  denies alcohol induced seizures.


Denies SI/HI





Hx of Gastritis.


Denies Psych hx, wants to see the psychiatrist for insomnia


Exam Limitations: No Limitations





- Ebola screening


Have you traveled outside of the country in the last 21 days: No


Have you had contact with anyone from an Ebola affected area: No


Have you been sick,other than usual withdrawal symptoms: No


Do you have a fever: No





- Review of Systems


Constitutional: Loss of Appetite, Night Sweats


EENT: reports: Blurred Vision, Dental Problems (upper dentures)


Respiratory: reports: No Symptoms reported


Cardiac: reports: No Symptoms Reported


GI: reports: Diarrhea, Nausea, Vomiting


: reports: No Symptoms Reported


Musculoskeletal: reports: No Symptoms Reported


Integumentary: reports: Flushing


Neuro: reports: No Symptoms reported


Endocrine: reports: No Symptoms Reported


Hematology: reports: No Symptoms Reported


Psychiatric: reports: No Sypmtoms Reported, Agitated, Anxious


Other Systems: Reviewed and Negative





Patient History





- Patient Medical History


Hx Anemia: No


Hx Asthma: No


Hx Chronic Obstructive Pulmonary Disease (COPD): No


Hx Cancer: No


Hx Cardiac Disorders: No


Hx Congestive Heart Failure: No


Hx Hypertension: No


Hx Hypercholesterolemia: No


Hx Pacemaker: No


HX Cerebrovascular Accident: No


Hx Seizures: No


Hx Dementia: No


Hx Diabetes: No


Hx Gastrointestinal Disorders: Yes (Gastritis)


Hx Liver Disease: No


Hx Genitourinary Disorders: No


Hx Sexually Transmitted Disorders: No (Gonorrhea at 14y/o)


Hx Renal Disease (ESRD): No


Hx Thyroid Disease: No


Hx Human Immunodeficiency Virus (HIV): No (negative)


Hx Hepatitis C: No


Hx Depression: Yes


Hx Suicide Attempt: No


Hx Bipolar Disorder: No


Hx Schizophrenia: No





- Patient Surgical History


Past Surgical History: Yes


Hx Neurologic Surgery: No


Hx Cataract Extraction: No


Hx Cardiac Surgery: No


Hx Lung Surgery: No


Hx Breast Surgery: No


Hx Breast Biopsy: No


Hx Abdominal Surgery: No


Hx Appendectomy: No


Hx Cholecystectomy: No


Hx Genitourinary Surgery: No


Hx  Section: No


Hx Orthopedic Surgery: Yes (left elbow 2016)


Other Surgical History: fracture of rt shoulder


Anesthesia Reaction: No





- PPD History


Previous Implant?: Yes


Documented Results: Negative w/proof


Implanted On Prior R Admission?: Yes


Date: 18


Results: 0 mm





- Reproductive History


Patient is a Female of Child Bearing Age (11 -55 yrs old): No





- Smoking Cessation


Smoking history: Former smoker


Have you smoked in the past 12 months: Yes


Aproximately how many cigarettes per day: 1


If you are a former smoker, when did you quit?: 


Hx Chewing Tobacco Use: No


Initiated information on smoking cessation: Yes


'Breaking Loose' booklet given: 18





- Substance & Tx. History


Hx Alcohol Use: Yes


Hx Substance Use: Yes


Substance Use Type: Alcohol, Marijuana





- Substances Abused


  ** Alcohol


Route: Oral


Frequency: Daily


Amount used: 9 (24oz) cans of [employer] beer


Age of first use: 15


Date of Last Use: 18





  ** Marijuana/Hashish


Route: Smoking


Frequency: Daily


Amount used: $90


Age of first use: 15


Date of Last Use: 18





Family Disease History





- Family Disease History


Family Disease History: Diabetes: Mother, [place]: Father (prostate )





Admission Physical Exam [facility]





- Vital Signs


Vital Signs: 


 Vital Signs - 24 hr











  18





  12:52


 


Temperature 96.4 F L


 


Pulse Rate 98 H


 


Respiratory 18





Rate 


 


Blood Pressure 133/76














- Physical


General Appearance: Yes: Mild Distress, Irritable, Anxious


HEENTM: Yes: Within Normal Limits


Respiratory: Yes: No Respiratory Distress, No Accessory Muscle Use


Neck: Yes: No masses,lesions,Nodules, Trachea in good position


Breast: Yes: Breast Exam Deferred


Cardiology: Yes: Regular Rate


Abdominal: Yes: Non Tender, Distended


Genitourinary: Yes: Within Normal Limits


Back: Yes: Normal Inspection


Musculoskeletal: Yes: full range of Motion, Gait Steady


Extremities: Yes: Normal Capillary Refill, Normal Inspection


Neurological: Yes: Alert, Motor Strength 5/5


Integumentary: Yes: Within Normal Limits


Lymphatic: Yes: Within Normal Limits





- Diagnostic


(1) Alcohol dependence with uncomplicated withdrawal


Current Visit: No   Status: Acute   





(2) Alcohol-induced sleep disorder


Current Visit: No   Status: Acute   





(3) Cannabis dependence


Current Visit: No   Status: Acute   





(4) Depressed mood


Current Visit: No   Status: Acute   





(5) Insomnia


Current Visit: No   Status: Acute   


Qualifiers: 


   Insomnia type: unspecified   Qualified Code(s): G47.00 - Insomnia, 

unspecified   





(6) GERD (gastroesophageal reflux disease)


Current Visit: No   Status: Chronic   


Qualifiers: 


   Esophagitis presence: without esophagitis   Qualified Code(s): K21.9 - Gastro

-esophageal reflux disease without esophagitis   





Cleared for Admission [facility]





- Detox or Rehab


[facility] Level of Care: Medically Managed


Detox Regimen/Protocol: Librium





[facility] Breath Alcohol Content


Breath Alcohol Content: 0.233





Urine Drug Screen





- Results


Drug Screen Negative: No


Urine Drug Screen Results: THC-Marijuana, BZO-Benzodiazepines

## 2018-11-19 VITALS — SYSTOLIC BLOOD PRESSURE: 136 MMHG | TEMPERATURE: 96.2 F | HEART RATE: 103 BPM | DIASTOLIC BLOOD PRESSURE: 96 MMHG

## 2018-11-19 LAB
ALBUMIN SERPL-MCNC: 3.6 G/DL (ref 3.4–5)
ALP SERPL-CCNC: 69 U/L (ref 45–117)
ALT SERPL-CCNC: 23 U/L (ref 13–61)
ANION GAP SERPL CALC-SCNC: 9 MMOL/L (ref 8–16)
AST SERPL-CCNC: 25 U/L (ref 15–37)
BILIRUB SERPL-MCNC: 0.4 MG/DL (ref 0.2–1)
BUN SERPL-MCNC: 10 MG/DL (ref 7–18)
CALCIUM SERPL-MCNC: 8.3 MG/DL (ref 8.5–10.1)
CHLORIDE SERPL-SCNC: 103 MMOL/L (ref 98–107)
CO2 SERPL-SCNC: 29 MMOL/L (ref 21–32)
CREAT SERPL-MCNC: 0.9 MG/DL (ref 0.55–1.3)
DEPRECATED RDW RBC AUTO: 15.2 % (ref 11.9–15.9)
GLUCOSE SERPL-MCNC: 132 MG/DL (ref 74–106)
HCT VFR BLD CALC: 40 % (ref 35.4–49)
HGB BLD-MCNC: 12.9 GM/DL (ref 11.7–16.9)
MCH RBC QN AUTO: 28.9 PG (ref 25.7–33.7)
MCHC RBC AUTO-ENTMCNC: 32.2 G/DL (ref 32–35.9)
MCV RBC: 89.6 FL (ref 80–96)
PLATELET # BLD AUTO: 132 K/MM3 (ref 134–434)
PMV BLD: 9.8 FL (ref 7.5–11.1)
POTASSIUM SERPLBLD-SCNC: 3.7 MMOL/L (ref 3.5–5.1)
PROT SERPL-MCNC: 6.6 G/DL (ref 6.4–8.2)
RBC # BLD AUTO: 4.47 M/MM3 (ref 4–5.6)
SODIUM SERPL-SCNC: 141 MMOL/L (ref 136–145)
WBC # BLD AUTO: 7.3 K/MM3 (ref 4–10)

## 2018-11-19 NOTE — DS
[facility] Detox Discharge Summary


Admission Date: 


11/18/18





Discharge Date: 11/19/18





- History


Present History: Alcohol Dependence


Additional Comments: 





52 years old male admitted on 11/18/18 for alcohol withdrawal sx


insists to leave the detox unit that his dog is in the hospital


patient is alert oriented x 3 no acute distress denies suicidal denies 

homocidal no self destructive behavior


strong recommend aftercare community 12 steps meeting and groups





- Physical Exam Results


Vital Signs: 


 Vital Signs











Temperature  96.2 F L  11/19/18 09:19


 


Pulse Rate  103 H  11/19/18 09:19


 


Respiratory Rate  20   11/19/18 09:19


 


Blood Pressure  136/96   11/19/18 09:19


 


O2 Sat by Pulse Oximetry (%)      











Pertinent Admission Physical Exam Findings: 





alcohol withdrawal sx


 Vital Signs











Temperature  96.2 F L  11/19/18 09:19


 


Pulse Rate  103 H  11/19/18 09:19


 


Respiratory Rate  20   11/19/18 09:19


 


Blood Pressure  136/96   11/19/18 09:19


 


O2 Sat by Pulse Oximetry (%)      








 Laboratory Last Values











WBC  7.3 K/mm3 (4.0-10.0)   11/19/18  07:00    


 


RBC  4.47 M/mm3 (4.00-5.60)   11/19/18  07:00    


 


Hgb  12.9 GM/dL (11.7-16.9)   11/19/18  07:00    


 


Hct  40.0 % (35.4-49)   11/19/18  07:00    


 


MCV  89.6 fl (80-96)   11/19/18  07:00    


 


MCH  28.9 pg (25.7-33.7)   11/19/18  07:00    


 


MCHC  32.2 g/dl (32.0-35.9)   11/19/18  07:00    


 


RDW  15.2 % (11.9-15.9)   11/19/18  07:00    


 


Plt Count  132 K/MM3 (134-434)  L D 11/19/18  07:00    


 


MPV  9.8 fl (7.5-11.1)  D 11/19/18  07:00    


 


Sodium  141 mmol/L (136-145)   11/19/18  07:00    


 


Potassium  3.7 mmol/L (3.5-5.1)   11/19/18  07:00    


 


Chloride  103 mmol/L ()   11/19/18  07:00    


 


Carbon Dioxide  29 mmol/L (21-32)   11/19/18  07:00    


 


Anion Gap  9 MMOL/L (8-16)   11/19/18  07:00    


 


BUN  10 mg/dL (7-18)   11/19/18  07:00    


 


Creatinine  0.9 mg/dL (0.55-1.3)   11/19/18  07:00    


 


Creat Clearance w eGFR  > 60  (>60)   11/19/18  07:00    


 


Random Glucose  132 mg/dL ()  H  11/19/18  07:00    


 


Calcium  8.3 mg/dL (8.5-10.1)  L  11/19/18  07:00    


 


Total Bilirubin  0.4 mg/dL (0.2-1)   11/19/18  07:00    


 


AST  25 U/L (15-37)   11/19/18  07:00    


 


ALT  23 U/L (13-61)   11/19/18  07:00    


 


Alkaline Phosphatase  69 U/L ()   11/19/18  07:00    


 


Total Protein  6.6 g/dl (6.4-8.2)   11/19/18  07:00    


 


Albumin  3.6 g/dl (3.4-5.0)   11/19/18  07:00    


 


Urine Color  Colorless   11/18/18  22:34    


 


Urine Appearance  Clear   11/18/18  22:34    


 


Urine pH  6.0  (5.0-8.0)   11/18/18  22:34    


 


Ur Specific Gravity  1.002  (1.010-1.035)  L  11/18/18  22:34    


 


Urine Protein  Negative  (NEGATIVE)   11/18/18  22:34    


 


Urine Glucose (UA)  Negative  (NEGATIVE)   11/18/18  22:34    


 


Urine Ketones  Negative  (NEGATIVE)   11/18/18  22:34    


 


Urine Blood  1+  (NEGATIVE)  H  11/18/18  22:34    


 


Urine Nitrite  Negative  (NEGATIVE)   11/18/18  22:34    


 


Urine Bilirubin  Negative  (<2.0 mg/dL)   11/18/18  22:34    


 


Urine Urobilinogen  Negative mg/dL (0.2-1.0)   11/18/18  22:34    


 


Ur Leukocyte Esterase  Negative  (NEGATIVE)   11/18/18  22:34    


 


Urine WBC (Auto)  None /hpf (3-5)   11/18/18  22:34    


 


Urine RBC (Auto)  None /hpf (0-3)   11/18/18  22:34    


 


RPR Titer  Nonreactive  (NONREACTIVE)   11/19/18  07:00    








lab noted





- Treatment


Hospital Course: Detox Protocol Followed, Responded well





- Medication


Discharge Medications: 


Ambulatory Orders





NK [No Known Home Medication]  07/31/18 











- Diagnosis


(1) Alcohol dependence with uncomplicated withdrawal


Status: Acute   





(2) GERD (gastroesophageal reflux disease)


Status: Chronic   


Qualifiers: 


   Esophagitis presence: without esophagitis   Qualified Code(s): K21.9 - Gastro

-esophageal reflux disease without esophagitis   





- AMA


Did Patient Leave Against Medical Advice: Yes

## 2018-11-19 NOTE — EKG
Test Reason : 

Blood Pressure : ***/*** mmHG

Vent. Rate : 098 BPM     Atrial Rate : 098 BPM

   P-R Int : 144 ms          QRS Dur : 094 ms

    QT Int : 338 ms       P-R-T Axes : 047 061 066 degrees

   QTc Int : 431 ms

 

NORMAL SINUS RHYTHM

NORMAL ECG

WHEN COMPARED WITH ECG OF 20-SEP-2018 19:08,

NO SIGNIFICANT CHANGE WAS FOUND

Confirmed by MICHELLE GILBERT MD ([id number]) on 11/19/2018 11:53:42 PM

 

Referred By:             Confirmed By:MICHELLE GILBERT MD

## 2019-01-22 ENCOUNTER — HOSPITAL ENCOUNTER (INPATIENT)
Dept: HOSPITAL 74 - YASAS | Age: 54
LOS: 1 days | Discharge: LEFT BEFORE DISCHARGE | DRG: 894 | End: 2019-01-23
Attending: NEUROMUSCULOSKELETAL MEDICINE & OMM | Admitting: NEUROMUSCULOSKELETAL MEDICINE & OMM
Payer: COMMERCIAL

## 2019-01-22 VITALS — BODY MASS INDEX: 27.8 KG/M2

## 2019-01-22 DIAGNOSIS — K21.9: ICD-10-CM

## 2019-01-22 DIAGNOSIS — F12.20: ICD-10-CM

## 2019-01-22 DIAGNOSIS — F10.282: ICD-10-CM

## 2019-01-22 DIAGNOSIS — F10.230: Primary | ICD-10-CM

## 2019-01-22 PROCEDURE — HZ2ZZZZ DETOXIFICATION SERVICES FOR SUBSTANCE ABUSE TREATMENT: ICD-10-PCS | Performed by: NEUROMUSCULOSKELETAL MEDICINE & OMM

## 2019-01-22 NOTE — HP
CIWA Score


Nausea/Vomitin-No Nausea/No Vomiting


Muscle Tremors: 2


Anxiety: 6


Agitation: 7-Pacing/Thrashing


Paroxysmal Sweats: No Perspiration


Orientation: 1-Uncertain about Date


Tacttile Disturbances: 0-None


Auditory Disturbances: 0-None


Visual Disturbances: 0-None


Headache: 2-Mild


CIWA-Ar Total Score: 18





- Admission Criteria


[facility] Guidelines: Admission for Medically Managed Detox: 


Requires at least one of the followin. CIWA greater than 12


2. Seizures within the past 24 hours


3. Delirium tremens within the past 24 hours


4. Hallucinations within the past 24 hours


5. Acute intervention needed for co  occurring medical disorder


6. Acute intervention needed for co  occurring psychiatric disorder


7. Severe withdrawal that cannot be handled at a lower level of care (continued


    vomiting, continued diarrhea, abnormal vital signs) requiring intravenous


    medication and/or fluids


8. Pregnancy








Admission ROS [facility]





- [facility]


Chief Complaint: 





ETOH WITHDRAWAL SX


Allergies/Adverse Reactions: 


 Allergies











Allergy/AdvReac Type Severity Reaction Status Date / Time


 


No Known Allergies Allergy   Verified 19 16:09











History of Present Illness: 





PATIENT PRESENTS WITH ETOH WITHDRAWAL SYMPTOMS. PATIENT IS KNOWN TO [facility] AND 

HAS HAD MULTIPLE ADMISSIONS THIS YEAR, LAST ADMISSION 2018. PATIENT STARTED 

DRINKING AT AGE 15, DRINKS 12 BEERS DAILY, LAST DRINK 1-2 HOURS AGO. PATIENT 

ALSO SMOKES MARIJUANA 2-3 BLUNTS DAILY, LAST TIME HE SMOKED WAS 1-2 DAYS AGO. 

PATIENT DENIES SEIZURES, FALLS AND BLACKOUTS. + H/O BINGE DRINKER. PATIENT PMH 

INCLUDES SLEEP DISTURBANCE AND GERD. PATIENT DENIES SI/HI AND SUICIDE ATTEMPTS.


Exam Limitations: Intoxication





- Ebola screening


Have you traveled outside of the country in the last 21 days: No


Have you had contact with anyone from an Ebola affected area: No


Have you been sick,other than usual withdrawal symptoms: No





- Review of Systems


Constitutional: Changes in sleep, Unexplained wgt Loss


EENT: reports: No Symptoms Reported


Respiratory: reports: No Symptoms reported


Cardiac: reports: No Symptoms Reported


GI: reports: Poor Fluid Intake


: reports: No Symptoms Reported


Musculoskeletal: reports: No Symptoms Reported


Integumentary: reports: Flushing


Neuro: reports: Headache, Tremors


Endocrine: reports: Unexplained Weight Loss


Hematology: reports: No Symptoms Reported


Psychiatric: reports: Anxious (FORGETFUL WITH DATE), Depressed





Patient History





- Patient Medical History


Hx Anemia: No


Hx Asthma: No


Hx Chronic Obstructive Pulmonary Disease (COPD): No


Hx Cancer: No


Hx Cardiac Disorders: No


Hx Congestive Heart Failure: No


Hx Hypertension: No


Hx Hypercholesterolemia: No


Hx Pacemaker: No


HX Cerebrovascular Accident: No


Hx Seizures: No


Hx Dementia: No


Hx Diabetes: No


Hx Gastrointestinal Disorders: Yes (acid reflux.)


Hx Liver Disease: No


Hx Genitourinary Disorders: No


Hx Sexually Transmitted Disorders: No


Hx Renal Disease (ESRD): No


Hx Thyroid Disease: No


Hx Human Immunodeficiency Virus (HIV): No (negative)


Hx Hepatitis C: No


Hx Depression: No


Hx Suicide Attempt: No


Hx Bipolar Disorder: No


Hx Schizophrenia: No





- Patient Surgical History


Past Surgical History: Yes


Hx Neurologic Surgery: No


Hx Cataract Extraction: No


Hx Cardiac Surgery: No


Hx Lung Surgery: No


Hx Breast Surgery: No


Hx Breast Biopsy: No


Hx Abdominal Surgery: No


Hx Appendectomy: No


Hx Cholecystectomy: No


Hx Genitourinary Surgery: No


Hx Orthopedic Surgery: Yes (left elbow 2016)


Other Surgical History: fracture of rt shoulder sx


Anesthesia Reaction: No





- PPD History


Previous Implant?: Yes


Documented Results: Negative w/o proof


Implanted On Prior [facility] Admission?: No


Date: 18


Results: 0 mm


PPD to be Administered?: Yes





- Smoking Cessation


Smoking history: Former smoker


Have you smoked in the past 12 months: Yes


Aproximately how many cigarettes per day: 1


If you are a former smoker, when did you quit?: 


Hx Chewing Tobacco Use: No


Initiated information on smoking cessation: No





- Substance & Tx. History


Hx Alcohol Use: Yes


Hx Substance Use: Yes


Substance Use Type: Alcohol, Marijuana


Hx Substance Use Treatment: Yes





- Substances Abused


  ** Alcohol


Route: Oral


Frequency: Daily


Amount used: 12PK BEER


Age of first use: 15


Date of Last Use: 19





  ** Marijuana/Hashish


Route: Smoking


Frequency: Daily


Amount used: 2-3 BLUNTS


Age of first use: 15


Date of Last Use: 19





Family Disease History





- Family Disease History


Family Disease History: Diabetes: Mother, [place]: Father (prostate )





Admission Physical Exam [facility]





- Vital Signs


Vital Signs: 


 Vital Signs - 24 hr











  19





  16:04


 


Temperature 97.5 F L


 


Pulse Rate 105 H


 


Respiratory 18





Rate 


 


Blood Pressure 144/76














- Physical


General Appearance: Yes: No Apparent Distress, Appropriately Dressed, 

Intoxicated, Tremorous, Anxious


HEENTM: Yes: EOMI, Hearing grossly Normal, Normal ENT Inspection, Normocephalic

, Normal Voice, BERNARDINO, Pharynx Normal


Respiratory: Yes: Chest Non-Tender, Lungs Clear, Normal Breath Sounds, No 

Respiratory Distress, No Accessory Muscle Use


Neck: Yes: No masses,lesions,Nodules, Supple, Trachea in good position


Breast: Yes: Breast Exam Deferred


Cardiology: Yes: Regular Rhythm, Regular Rate, S1, S2


Abdominal: Yes: Normal Bowel Sounds, Non Tender, Soft


Genitourinary: Yes: Within Normal Limits


Back: Yes: Normal Inspection


Musculoskeletal: Yes: full range of Motion, Gait Steady


Extremities: Yes: Normal Inspection, Normal Range of Motion, Non-Tender, Tremors

, Erythema


Neurological: Yes: CNs II-XII NML intact, Alert, Motor Strength 5/5, Normal 

Response, Other (ANXIOUS)


Integumentary: Yes: Normal Color, Dry, Warm, Erythema


Lymphatic: Yes: Within Normal Limits





- Diagnostic


(1) Alcohol dependence with uncomplicated withdrawal


Current Visit: Yes   Status: Acute   





(2) Alcohol-induced sleep disorder


Current Visit: Yes   Status: Acute   





(3) Cannabis dependence


Current Visit: Yes   Status: Chronic   





(4) GERD (gastroesophageal reflux disease)


Current Visit: Yes   Status: Chronic   


Qualifiers: 


   Esophagitis presence: without esophagitis   Qualified Code(s): K21.9 - Gastro

-esophageal reflux disease without esophagitis   





Cleared for Admission [facility]





- Detox or Rehab


[facility] Level of Care: Medically Managed


Detox Regimen/Protocol: Librium





[facility] Breath Alcohol Content


Breath Alcohol Content: 0.279





Urine Drug Screen





- Results


Drug Screen Negative: No


Urine Drug Screen Results: THC-Marijuana

## 2019-01-23 VITALS — DIASTOLIC BLOOD PRESSURE: 92 MMHG | SYSTOLIC BLOOD PRESSURE: 140 MMHG | TEMPERATURE: 96.8 F

## 2019-01-23 VITALS — HEART RATE: 115 BPM

## 2019-01-23 LAB
ALBUMIN SERPL-MCNC: 3.9 G/DL (ref 3.4–5)
ALP SERPL-CCNC: 71 U/L (ref 45–117)
ALT SERPL-CCNC: 28 U/L (ref 13–61)
ANION GAP SERPL CALC-SCNC: 11 MMOL/L (ref 8–16)
APPEARANCE UR: (no result)
AST SERPL-CCNC: 33 U/L (ref 15–37)
BACTERIA #/AREA URNS HPF: (no result) /HPF
BILIRUB SERPL-MCNC: 0.8 MG/DL (ref 0.2–1)
BILIRUB UR STRIP.AUTO-MCNC: NEGATIVE MG/DL
BUN SERPL-MCNC: 11 MG/DL (ref 7–18)
CALCIUM SERPL-MCNC: 8.7 MG/DL (ref 8.5–10.1)
CHLORIDE SERPL-SCNC: 103 MMOL/L (ref 98–107)
CO2 SERPL-SCNC: 27 MMOL/L (ref 21–32)
COLOR UR: YELLOW
CREAT SERPL-MCNC: 0.7 MG/DL (ref 0.55–1.3)
DEPRECATED RDW RBC AUTO: 16.6 % (ref 11.9–15.9)
EPITH CASTS URNS QL MICRO: (no result) /HPF
GLUCOSE SERPL-MCNC: 83 MG/DL (ref 74–106)
HCT VFR BLD CALC: 40.4 % (ref 35.4–49)
HGB BLD-MCNC: 13.6 GM/DL (ref 11.7–16.9)
HYALINE CASTS URNS QL MICRO: 4 /LPF
KETONES UR QL STRIP: NEGATIVE
LEUKOCYTE ESTERASE UR QL STRIP.AUTO: NEGATIVE
MCH RBC QN AUTO: 29.3 PG (ref 25.7–33.7)
MCHC RBC AUTO-ENTMCNC: 33.7 G/DL (ref 32–35.9)
MCV RBC: 87.1 FL (ref 80–96)
MUCOUS THREADS URNS QL MICRO: (no result)
NITRITE UR QL STRIP: NEGATIVE
PH UR: 6 [PH] (ref 5–8)
PLATELET # BLD AUTO: 135 K/MM3 (ref 134–434)
PMV BLD: 9.2 FL (ref 7.5–11.1)
POTASSIUM SERPLBLD-SCNC: 3.6 MMOL/L (ref 3.5–5.1)
PROT SERPL-MCNC: 7.4 G/DL (ref 6.4–8.2)
PROT UR QL STRIP: NEGATIVE
PROT UR QL STRIP: NEGATIVE
RBC # BLD AUTO: 4.64 M/MM3 (ref 4–5.6)
SODIUM SERPL-SCNC: 141 MMOL/L (ref 136–145)
SP GR UR: 1.01 (ref 1.01–1.03)
UROBILINOGEN UR STRIP-MCNC: NEGATIVE MG/DL (ref 0.2–1)
WBC # BLD AUTO: 5 K/MM3 (ref 4–10)

## 2019-01-23 NOTE — DS
[facility] Detox Discharge Summary


Admission Date: 


01/22/19





Discharge Date: 01/23/19





- History


Present History: Alcohol Dependence, Cannabis Dependence


Additional Comments: 





PATIENT REPORTS THAT HE HAS A PERSONAL ISSUE TO ATTEND TO AND DOES NOT WISH TO 

REMAIN TO COMPLETE DETOX REGIMEN. RISKS OF LEAVING DETOX UNIT AGAINST MEDICAL 

ADVICE AND PRIOR TO COMPLETION OF DETOX REGIMEN EXPLAINED TO PATIENT. PATIENT 

ADVISED TO GO IMMEDIATELY TO NEAREST ER SHOULD ANY INTOLERABLE DETOX SYMPTOMS 

DEVELOP AT ANY TIME. PATIENT VERBALIZED UNDERSTANDING OF ALL INFORMATION / 

RECOMMENDATIONS PRESENTED TO HIM PRIOR TO DEPARTURE FROM DETOX UNIT. PATIENT 

LEFT DETOX UNIT IN STABLE MEDICAL CONDITION.


Pertinent Past History: 





Nicotine Dependence, G.E.R.D.





- Physical Exam Results


Vital Signs: 


 Vital Signs











Temperature  96.8 F L  01/23/19 09:19


 


Pulse Rate  115 H  01/23/19 11:30


 


Respiratory Rate  18   01/23/19 09:19


 


Blood Pressure  140/92   01/23/19 09:19


 


O2 Sat by Pulse Oximetry (%)      











Pertinent Admission Physical Exam Findings: 





WITHDRAWAL SYMPTOMS.





 Laboratory Tests











  01/23/19 01/23/19 01/23/19





  07:00 07:00 07:00


 


WBC  5.0  


 


RBC  4.64  


 


Hgb  13.6  


 


Hct  40.4  


 


MCV  87.1  


 


MCH  29.3  


 


MCHC  33.7  


 


RDW  16.6 H  


 


Plt Count  135  


 


MPV  9.2  


 


Sodium   141 


 


Potassium   3.6 


 


Chloride   103 


 


Carbon Dioxide   27 


 


Anion Gap   11 


 


BUN   11 


 


Creatinine   0.7 


 


Creat Clearance w eGFR   > 60 


 


Random Glucose   83 


 


Calcium   8.7 


 


Total Bilirubin   0.8 


 


AST   33 


 


ALT   28 


 


Alkaline Phosphatase   71 


 


Total Protein   7.4 


 


Albumin   3.9 


 


Urine Color    Yellow


 


Urine Appearance    Slcloudy


 


Urine pH    6.0


 


Ur Specific Gravity    1.014


 


Urine Protein    Negative


 


Urine Glucose (UA)    Negative


 


Urine Ketones    Negative


 


Urine Blood    1+ H


 


Urine Nitrite    Negative


 


Urine Bilirubin    Negative


 


Urine Urobilinogen    Negative


 


Ur Leukocyte Esterase    Negative


 


Urine WBC (Auto)    2


 


Urine RBC (Auto)    8


 


Ur Epithelial Cells    Rare


 


Urine Bacteria    Rare


 


Hyaline Casts    4


 


Urine Mucus    Many








LABS NOTED.





- Treatment


Hospital Course: Detoxed Safely





- Medication


Discharge Medications: 


Ambulatory Orders





NK [No Known Home Medication]  07/31/18 











- Diagnosis


(1) Alcohol dependence with uncomplicated withdrawal


Status: Acute   





(2) Alcohol-induced sleep disorder


Status: Acute   





(3) Cannabis dependence


Status: Chronic   





(4) GERD (gastroesophageal reflux disease)


Status: Chronic   


Qualifiers: 


   Esophagitis presence: without esophagitis   Qualified Code(s): K21.9 - Gastro

-esophageal reflux disease without esophagitis   





- AMA


Did Patient Leave Against Medical Advice: Yes (PT HAS PERSONAL ISSUE ADN DOES 

NOT WISH TO REMAIN TO COMPLETE DETOX REGIMEN)

## 2019-01-23 NOTE — PN
S CIWA





- CIWA Score


Nausea/Vomitin-No Nausea/No Vomiting


Muscle Tremors: 3


Anxiety: 3


Agitation: 2


Paroxysmal Sweats: 3


Orientation: 0-Oriented


Tacttile Disturbances: 2-Mild Itch/Numbness/Burn


Auditory Disturbances: 0-None


Visual Disturbances: 2-Mild Sensitivity


Headache: 0-None Present


CIWA-Ar Total Score: 15





[facility] Progress Note (SOAP)


Subjective: 





Stomach Cramping, Sweating, Tremors, Constipation, Anxious.


Objective: 


PATIENT A & O X 3, OBSERVED AMBULATING ON UNIT. IN NO ACUTE DISTRESS.





19 12:18


 Vital Signs











Temperature  96.8 F L  19 09:19


 


Pulse Rate  115 H  19 11:30


 


Respiratory Rate  18   19 09:19


 


Blood Pressure  140/92   19 09:19


 


O2 Sat by Pulse Oximetry (%)      








 Laboratory Tests











  19





  07:00 07:00 07:00


 


WBC  5.0  


 


RBC  4.64  


 


Hgb  13.6  


 


Hct  40.4  


 


MCV  87.1  


 


MCH  29.3  


 


MCHC  33.7  


 


RDW  16.6 H  


 


Plt Count  135  


 


MPV  9.2  


 


Sodium   141 


 


Potassium   3.6 


 


Chloride   103 


 


Carbon Dioxide   27 


 


Anion Gap   11 


 


BUN   11 


 


Creatinine   0.7 


 


Creat Clearance w eGFR   > 60 


 


Random Glucose   83 


 


Calcium   8.7 


 


Total Bilirubin   0.8 


 


AST   33 


 


ALT   28 


 


Alkaline Phosphatase   71 


 


Total Protein   7.4 


 


Albumin   3.9 


 


Urine Color    Yellow


 


Urine Appearance    Slcloudy


 


Urine pH    6.0


 


Ur Specific Gravity    1.014


 


Urine Protein    Negative


 


Urine Glucose (UA)    Negative


 


Urine Ketones    Negative


 


Urine Blood    1+ H


 


Urine Nitrite    Negative


 


Urine Bilirubin    Negative


 


Urine Urobilinogen    Negative


 


Ur Leukocyte Esterase    Negative


 


Urine WBC (Auto)    2


 


Urine RBC (Auto)    8


 


Ur Epithelial Cells    Rare


 


Urine Bacteria    Rare


 


Hyaline Casts    4


 


Urine Mucus    Many








LABS NOTED.


RPR RESULT PENDING.


19 12:20





Assessment: 





19 12:19


WITHDRAWAL SYMPTOMS.


ELEVATED BLOOD PRESSURE.





19 12:19





Plan: 





CONTINUE DETOX.


INCREASE DAILY PO FLUID INTAKE.


PRN MOM FOR CONSTIPATION.

## 2019-03-15 ENCOUNTER — HOSPITAL ENCOUNTER (INPATIENT)
Dept: HOSPITAL 74 - YASAS | Age: 54
LOS: 1 days | Discharge: LEFT BEFORE DISCHARGE | DRG: 894 | End: 2019-03-16
Attending: SURGERY | Admitting: SURGERY
Payer: COMMERCIAL

## 2019-03-15 VITALS — BODY MASS INDEX: 26.6 KG/M2

## 2019-03-15 DIAGNOSIS — F32.9: ICD-10-CM

## 2019-03-15 DIAGNOSIS — F19.24: ICD-10-CM

## 2019-03-15 DIAGNOSIS — K21.9: ICD-10-CM

## 2019-03-15 DIAGNOSIS — F10.282: ICD-10-CM

## 2019-03-15 DIAGNOSIS — F12.20: ICD-10-CM

## 2019-03-15 DIAGNOSIS — G47.00: ICD-10-CM

## 2019-03-15 DIAGNOSIS — F10.230: Primary | ICD-10-CM

## 2019-03-15 PROCEDURE — HZ2ZZZZ DETOXIFICATION SERVICES FOR SUBSTANCE ABUSE TREATMENT: ICD-10-PCS | Performed by: SURGERY

## 2019-03-15 NOTE — HP
CIWA Score


Nausea/Vomitin-Mild Nausea/No Vomiting


Muscle Tremors: 4-Moderate,w/Arms Extend


Anxiety: 4-Mod. Anxious/Guarded


Agitation: 4-Moderately Restless


Paroxysmal Sweats: 3


Orientation: 0-Oriented


Tacttile Disturbances: 0-None


Auditory Disturbances: 0-None


Visual Disturbances: 0-None


Headache: 1-Very Mild


CIWA-Ar Total Score: 17





- Admission Criteria


[facility]AS Guidelines: Admission for Medically Managed Detox: 


Requires at least one of the followin. CIWA greater than 12


2. Seizures within the past 24 hours


3. Delirium tremens within the past 24 hours


4. Hallucinations within the past 24 hours


5. Acute intervention needed for co  occurring medical disorder


6. Acute intervention needed for co  occurring psychiatric disorder


7. Severe withdrawal that cannot be handled at a lower level of care (continued


    vomiting, continued diarrhea, abnormal vital signs) requiring intravenous


    medication and/or fluids


8. Pregnancy








Admission ROS [facility]





- HPI


Chief Complaint: 





I need help and I promise I wont sign out this time. I need to stop drinking. 


Allergies/Adverse Reactions: 


 Allergies











Allergy/AdvReac Type Severity Reaction Status Date / Time


 


No Known Allergies Allergy   Verified 03/15/19 14:49











History of Present Illness: 





pt is a 53yrold male with a history of alcohol dependence seeking detox for 

treatment. 


Exam Limitations: No Limitations





- Ebola screening


Have you traveled outside of the country in the last 21 days: No


Have you had contact with anyone from an Ebola affected area: No


Have you been sick,other than usual withdrawal symptoms: No


Do you have a fever: No





- Review of Systems


Constitutional: Chills, Diaphoresis, Loss of Appetite, Night Sweats, Changes in 

sleep


EENT: reports: Tearing, Nose Congestion


Respiratory: reports: No Symptoms reported


Cardiac: reports: Lightheadedness, Syncope


GI: reports: Diarrhea, Nausea, Poor Appetite, Poor Fluid Intake, Vomiting, 

Indigestion


: reports: No Symptoms Reported


Musculoskeletal: reports: Back Pain


Integumentary: reports: Flushing, Sweating


Neuro: reports: Headache, Tingling, Tremors


Endocrine: reports: Excessive Sweating, Flushing, Intolerance to Cold, 

Intolerance to Heat


Hematology: reports: No Symptoms Reported


Psychiatric: reports: Judgement Intact, Mood/Affect Appropiate, Orientated x3, 

Agitated, Anxious, Depressed


Other Systems: Reviewed and Negative





Patient History





- Patient Medical History


Hx Anemia: No


Hx Asthma: No


Hx Chronic Obstructive Pulmonary Disease (COPD): No


Hx Cancer: No


Hx Cardiac Disorders: No


Hx Congestive Heart Failure: No


Hx Hypertension: No


Hx Hypercholesterolemia: No


Hx Pacemaker: No


HX Cerebrovascular Accident: No


Hx Seizures: No


Hx Dementia: No


Hx Diabetes: No


Hx Gastrointestinal Disorders: Yes (acid reflux.)


Hx Liver Disease: No


Hx Genitourinary Disorders: No


Hx Sexually Transmitted Disorders: No


Hx Renal Disease (ESRD): No


Hx Thyroid Disease: No


Hx Human Immunodeficiency Virus (HIV): No (negative)


Hx Hepatitis C: No


Hx Depression: No


Hx Suicide Attempt: No


Hx Bipolar Disorder: No


Hx Schizophrenia: No





- Patient Surgical History


Past Surgical History: Yes


Hx Neurologic Surgery: No


Hx Cataract Extraction: No


Hx Cardiac Surgery: No


Hx Lung Surgery: No


Hx Breast Surgery: No


Hx Breast Biopsy: No


Hx Abdominal Surgery: No


Hx Appendectomy: No


Hx Cholecystectomy: No


Hx Genitourinary Surgery: No


Hx  Section: No


Hx Orthopedic Surgery: Yes (left elbow 2016)


Other Surgical History: fracture of rt shoulder sx


Anesthesia Reaction: No





- PPD History


Previous Implant?: Yes


Documented Results: Negative w/o proof


PPD to be Administered?: Yes





- Reproductive History


Patient is a Female of Child Bearing Age (11 -55 yrs old): No





- Smoking Cessation


Smoking history: Never smoked


Have you smoked in the past 12 months: No


Hx Chewing Tobacco Use: No


Initiated information on smoking cessation: No





- Substance & Tx. History


Hx Alcohol Use: Yes


Hx Substance Use: No


Substance Use Type: Alcohol


Hx Substance Use Treatment: Yes (last detox [facility] but never finished detox)





- Substances Abused


  ** Alcohol


Route: Oral


Frequency: Daily


Amount used: 3 6PKS


Age of first use: 15


Date of Last Use: 03/15/19





  ** Marijuana/Hashish


Route: Smoking


Frequency: Daily


Amount used: 3 TOKES


Age of first use: 15


Date of Last Use: 03/15/19





Family Disease History





- Family Disease History


Family Disease History: Diabetes: Mother, CA: Father (prostate )





Admission Physical Exam [facility]





- Vital Signs


Vital Signs: 


 Vital Signs - 24 hr











  03/15/19





  13:25


 


Temperature 96.4 F L


 


Pulse Rate 109 H


 


Respiratory 20





Rate 


 


Blood Pressure 117/78














- Physical


General Appearance: Yes: Appropriately Dressed, Moderate Distress, Tremorous, 

Irritable, Sweating, Anxious


HEENTM: Yes: Hearing grossly Normal, Normal Voice, Nasal Congestion, Rhinorrhea


Respiratory: Yes: Lungs Clear, Normal Breath Sounds, No Respiratory Distress


Neck: Yes: No masses,lesions,Nodules


Breast: Yes: Within Normal Limits


Cardiology: Yes: Regular Rhythm, Regular Rate, S1, S2, Tachycardia


Abdominal: Yes: Normal Bowel Sounds, Non Tender


Genitourinary: Yes: Within Normal Limits


Back: Yes: Normal Inspection


Musculoskeletal: Yes: full range of Motion


Extremities: Yes: Normal Capillary Refill, Normal Inspection, Non-Tender, 

Tremors


Neurological: Yes: Fully Oriented, Alert, Normal Response


Integumentary: Yes: Normal Color, Diaphoresis


Lymphatic: Yes: Within Normal Limits





- Diagnostic


(1) Alcohol dependence with uncomplicated withdrawal


Current Visit: Yes   Status: Chronic   





(2) Alcohol-induced sleep disorder


Current Visit: No   Status: Acute   





(3) Depressed mood


Current Visit: No   Status: Acute   





(4) Insomnia


Current Visit: Yes   Status: Chronic   


Qualifiers: 


   Insomnia type: unspecified   Qualified Code(s): G47.00 - Insomnia, 

unspecified   





(5) Cannabis dependence


Current Visit: Yes   Status: Chronic   





(6) GERD (gastroesophageal reflux disease)


Current Visit: Yes   Status: Chronic   


Qualifiers: 


   Esophagitis presence: without esophagitis   Qualified Code(s): K21.9 - Gastro

-esophageal reflux disease without esophagitis   





(7) Depression


Current Visit: No   Status: Suspected   


Qualifiers: 


   Depression Type: unspecified   Qualified Code(s): F32.9 - Major depressive 

disorder, single episode, unspecified   





Cleared for Admission [facility]





- Detox or Rehab


[facility] Level of Care: Medically Managed


Detox Regimen/Protocol: Librium





[facility] Breath Alcohol Content


Breath Alcohol Content: 0.293





Urine Drug Screen





- Results


Drug Screen Negative: No


Urine Drug Screen Results: THC-Marijuana





Inpatient Rehab Admission





- Rehab Decision to Admit


Inpatient rehab admission?: No

## 2019-03-15 NOTE — CONSULT
[facility] Psychiatric Consult





- Data


Date of interview: 03/15/19


Admission source: [facility]


Identifying data: Readmission to [facility] for this 52 y/o [place]-born male 

self-referred for detoxification (alcohol, cannabis). Evaluated on [facility]. 

Patient is , a father of three, domiciled, currently unemployed and 

supported on SSI benefits.


Substance Abuse History: Confirmed by the patient in this interview. Details in 

current [facility] report as followed : Smoking history: Never smoked.  Have you 

smoked in the past 12 months: No.  Hx Chewing Tobacco Use: No.  Initiated 

information on smoking cessation: No.  - Substance & Tx. History.  Hx Alcohol 

Use: Yes.  Hx Substance Use: No.  Substance Use Type: Alcohol.  Hx Substance 

Use Treatment: Yes (last detox [facility] but never finished detox).  - 

Substances Abused.  ** Alcohol.  Route: Oral.  Frequency: Daily.  Amount used: 

3 6PKS.  Age of first use: 15.  Date of Last Use: 03/15/19.  ** Marijuana/

Hashish.  Route: Smoking.  Frequency: Daily.  Amount used: 3 TOKES.  Age of 

first use: 15.  Date of Last Use: 03/15/19


Medical History: Patient endorses good general health. Records indicate a 

history of orthosurgery for fracture of left elbow and fracture of right 

shoulder (2016).


Psychiatric History: Patient denies history of psychiatric hospitalizations, 

OPD care or suicide attempts.


Physical/Sexual Abuse/Trauma History: Patient denies history of abuse. Stressor 

: heavy alcohol dependence.


Additional Comment: Urine Drug Screen Results: THC-Marijuana. Noted.





Mental Status Exam





- Mental Status Exam


Alert and Oriented to: Time, Place, Person


Cognitive Function: Good


Patient Appearance: Well Groomed (flushed facial features)


Mood: Nervous, Anxious (dysphoric)


Affect: Mood Congruent, Constricted (tense affect)


Patient Behavior: Fatigued, Talkative, Cooperative


Speech Pattern: Clear (more comfortable in Armenian ; fair understanding of 

english )


Voice Loudness: Normal


Thought Process: Intact, Goal Oriented


Thought Disorder: Not Present


Hallucinations: Denies


Suicidal Ideation: Denies


Homicidal Ideation: Denies


Insight/Judgement: Poor


Sleep: Poorly, Difficulty falling asleep


Appetite: Good


Muscle strength/Tone: Normal


Gait/Station: Normal





Psychiatric Findings





- Problem List (Axis 1, 2,3)


(1) Alcohol dependence with uncomplicated withdrawal


Current Visit: Yes   Status: Acute   





(2) Cannabis dependence


Current Visit: Yes   Status: Chronic   





(3) Substance induced mood disorder


Current Visit: Yes   Status: Chronic   





(4) Insomnia


Current Visit: Yes   Status: Chronic   


Qualifiers: 


   Insomnia type: unspecified   Qualified Code(s): G47.00 - Insomnia, 

unspecified   





- Initial Treatment Plan


Initial Treatment Plan: Psychoeducation. Sleep hygiene. Detoxification in 

progress. Support. Groups. AA meetings. Records from [facility] : revisited. Seroquel 

50 mg po hs (patient's request). Ordered. Side effects/benefits discussed. Mr Ortiz is in agreement with this plan of care. Observation.

## 2019-03-16 VITALS — TEMPERATURE: 97.9 F | DIASTOLIC BLOOD PRESSURE: 88 MMHG | SYSTOLIC BLOOD PRESSURE: 140 MMHG

## 2019-03-16 VITALS — HEART RATE: 101 BPM

## 2019-03-16 LAB
ALBUMIN SERPL-MCNC: 4.2 G/DL (ref 3.4–5)
ALP SERPL-CCNC: 90 U/L (ref 45–117)
ALT SERPL-CCNC: 34 U/L (ref 13–61)
ANION GAP SERPL CALC-SCNC: 9 MMOL/L (ref 8–16)
AST SERPL-CCNC: 53 U/L (ref 15–37)
BILIRUB SERPL-MCNC: 0.3 MG/DL (ref 0.2–1)
BUN SERPL-MCNC: 6 MG/DL (ref 7–18)
CALCIUM SERPL-MCNC: 8.7 MG/DL (ref 8.5–10.1)
CHLORIDE SERPL-SCNC: 101 MMOL/L (ref 98–107)
CO2 SERPL-SCNC: 26 MMOL/L (ref 21–32)
CREAT SERPL-MCNC: 0.8 MG/DL (ref 0.55–1.3)
DEPRECATED RDW RBC AUTO: 17.3 % (ref 11.9–15.9)
GLUCOSE SERPL-MCNC: 109 MG/DL (ref 74–106)
HCT VFR BLD CALC: 39.1 % (ref 35.4–49)
HGB BLD-MCNC: 13.5 GM/DL (ref 11.7–16.9)
MCH RBC QN AUTO: 30.5 PG (ref 25.7–33.7)
MCHC RBC AUTO-ENTMCNC: 34.6 G/DL (ref 32–35.9)
MCV RBC: 88.1 FL (ref 80–96)
PLATELET # BLD AUTO: 166 K/MM3 (ref 134–434)
PMV BLD: 9.6 FL (ref 7.5–11.1)
POTASSIUM SERPLBLD-SCNC: 3.9 MMOL/L (ref 3.5–5.1)
PROT SERPL-MCNC: 8 G/DL (ref 6.4–8.2)
RBC # BLD AUTO: 4.44 M/MM3 (ref 4–5.6)
SODIUM SERPL-SCNC: 136 MMOL/L (ref 136–145)
WBC # BLD AUTO: 5 K/MM3 (ref 4–10)

## 2019-03-16 NOTE — PN
S CIWA





- CIWA Score


Nausea/Vomitin


Muscle Tremors: None


Anxiety: 4-Mod. Anxious/Guarded


Agitation: 4-Moderately Restless


Paroxysmal Sweats: No Perspiration


Orientation: 0-Oriented


Tacttile Disturbances: 2-Mild Itch/Numbness/Burn


Auditory Disturbances: 0-None


Visual Disturbances: 0-None


Headache: 3-Moderate


CIWA-Ar Total Score: 18





[facility] Progress Note (SOAP)


Subjective: 





Anxious, Restless, Vomiting, Body Aches, H/A.


Objective: 


PATIENT A & O X 3, OBSERVED AMBULATING ON UNIT. IN NO ACUTE DISTRESS.





19 13:51


 Vital Signs











Temperature  97.9 F   19 13:48


 


Pulse Rate  103 H  19 13:48


 


Respiratory Rate  20   19 13:48


 


Blood Pressure  140/88   19 13:48


 


O2 Sat by Pulse Oximetry (%)      








 Laboratory Tests











  19





  05:40 05:40 05:40


 


WBC  5.0  


 


RBC  4.44  


 


Hgb  13.5  


 


Hct  39.1  


 


MCV  88.1  


 


MCH  30.5  


 


MCHC  34.6  


 


RDW  17.3 H  


 


Plt Count  166  D  


 


MPV  9.6  


 


Sodium   136 


 


Potassium   3.9 


 


Chloride   101 


 


Carbon Dioxide   26 


 


Anion Gap   9 


 


BUN   6 L 


 


Creatinine   0.8 


 


Creat Clearance w eGFR   101.12 


 


Random Glucose   109 H 


 


Calcium   8.7 


 


Total Bilirubin   0.3 


 


AST   53 H 


 


ALT   34 


 


Alkaline Phosphatase   90 


 


Total Protein   8.0 


 


Albumin   4.2 


 


RPR Titer    Nonreactive








LABS NOTED.


Assessment: 





19 13:51


WITHDRAWAL SYMPTOMS.


Plan: 





CONTINUE DETOX.


PRN ROBAXIN FOR BODY ACHES / MUSCLE SPASMS.

## 2019-03-16 NOTE — DS
[facility] Detox Discharge Summary


Admission Date: 


03/15/19





Discharge Date: 03/16/19





- History


Present History: Alcohol Dependence, Cannabis Dependence


Additional Comments: 





PATIENT DOES NOT WISH TO REMAIN TO COMPLETE DETOX REGIMEN. RISKS OF LEAVING 

DETOX UNIT AGAINST MEDICAL ADVICE AND PRIOR TO COMPLETION OF DETOX REGIMEN 

EXPLAINED TO PATIENT. PATIENT ADVISED TO GO IMMEDIATELY TO NEAREST ER SHOULD 

ANY INTOLERABLE DETOX SYMPTOMS DEVELOP AT ANY TIME. PATIENT VERBALIZED 

UNDERSTANDING OF ALL INFORMATION / RECOMMENDATIONS PRESENTED TO HIM PRIOR TO 

DEPARTURE FROM DETOX UNIT. PATIENT LEFT DETOX UNIT IN STABLE MEDICAL CONDITION.


Pertinent Past History: 





History of G.E.R.D., History of Depression, History Of Insomnia.





- Physical Exam Results


Vital Signs: 


 Vital Signs











Temperature  97.9 F   03/16/19 13:48


 


Pulse Rate  103 H  03/16/19 13:48


 


Respiratory Rate  20   03/16/19 13:48


 


Blood Pressure  140/88   03/16/19 13:48


 


O2 Sat by Pulse Oximetry (%)      











Pertinent Admission Physical Exam Findings: 





WITHDRAWAL SYMPTOMS.





 Laboratory Tests











  03/16/19 03/16/19 03/16/19





  05:40 05:40 05:40


 


WBC  5.0  


 


RBC  4.44  


 


Hgb  13.5  


 


Hct  39.1  


 


MCV  88.1  


 


MCH  30.5  


 


MCHC  34.6  


 


RDW  17.3 H  


 


Plt Count  166  D  


 


MPV  9.6  


 


Sodium   136 


 


Potassium   3.9 


 


Chloride   101 


 


Carbon Dioxide   26 


 


Anion Gap   9 


 


BUN   6 L 


 


Creatinine   0.8 


 


Creat Clearance w eGFR   101.12 


 


Random Glucose   109 H 


 


Calcium   8.7 


 


Total Bilirubin   0.3 


 


AST   53 H 


 


ALT   34 


 


Alkaline Phosphatase   90 


 


Total Protein   8.0 


 


Albumin   4.2 


 


RPR Titer    Nonreactive








LABS NOTED.





- Treatment


Hospital Course: Detox Protocol Followed, Detoxed Safely





- Medication


Discharge Medications: 


Ambulatory Orders





NK [No Known Home Medication]  07/31/18 











- Diagnosis


(1) Cannabis dependence


Current Visit: Yes   Status: Chronic   





(2) GERD (gastroesophageal reflux disease)


Current Visit: Yes   Status: Chronic   


Qualifiers: 


   Esophagitis presence: without esophagitis   Qualified Code(s): K21.9 - Gastro

-esophageal reflux disease without esophagitis   





(3) Alcohol dependence with uncomplicated withdrawal


Current Visit: Yes   Status: Acute   





(4) Insomnia


Current Visit: Yes   Status: Chronic   


Qualifiers: 


   Insomnia type: unspecified   Qualified Code(s): G47.00 - Insomnia, 

unspecified   





(5) Substance induced mood disorder


Current Visit: Yes   Status: Chronic   





(6) Alcohol-induced sleep disorder


Current Visit: Yes   Status: Acute   





(7) Depressed mood


Current Visit: Yes   Status: Acute   





(8) Depression


Current Visit: Yes   Status: Suspected   


Qualifiers: 


   Depression Type: unspecified   Qualified Code(s): F32.9 - Major depressive 

disorder, single episode, unspecified   





- AMA


Did Patient Leave Against Medical Advice: Yes (PATIENT DID NOT WISH TO REMAIN 

TO COMPLETE DETOX REGIMEN.)

## 2019-12-03 ENCOUNTER — HOSPITAL ENCOUNTER (INPATIENT)
Dept: HOSPITAL 74 - YASAS | Age: 54
LOS: 2 days | Discharge: LEFT BEFORE DISCHARGE | DRG: 894 | End: 2019-12-05
Attending: ALLERGY & IMMUNOLOGY | Admitting: ALLERGY & IMMUNOLOGY
Payer: COMMERCIAL

## 2019-12-03 VITALS — BODY MASS INDEX: 27.1 KG/M2

## 2019-12-03 DIAGNOSIS — F10.220: ICD-10-CM

## 2019-12-03 DIAGNOSIS — F12.20: ICD-10-CM

## 2019-12-03 DIAGNOSIS — F10.230: Primary | ICD-10-CM

## 2019-12-03 DIAGNOSIS — K21.9: ICD-10-CM

## 2019-12-03 PROCEDURE — HZ2ZZZZ DETOXIFICATION SERVICES FOR SUBSTANCE ABUSE TREATMENT: ICD-10-PCS | Performed by: ALLERGY & IMMUNOLOGY

## 2019-12-03 RX ADMIN — Medication SCH: at 22:22

## 2019-12-03 NOTE — HP
CIWA Score


Nausea/Vomitin-No Nausea/No Vomiting


Muscle Tremors: 4-Moderate,w/Arms Extend


Anxiety: 4-Mod. Anxious/Guarded


Agitation: 4-Moderately Restless


Paroxysmal Sweats: No Perspiration


Orientation: 1-Uncertain about Date


Tacttile Disturbances: 0-None


Auditory Disturbances: 0-None


Visual Disturbances: 0-None


Headache: 0-None Present


CIWA-Ar Total Score: 13





- Admission Criteria


[facility] Guidelines: Admission for Medically Managed Detox: 


Requires at least one of the followin. CIWA greater than 12


2. Seizures within the past 24 hours


3. Delirium tremens within the past 24 hours


4. Hallucinations within the past 24 hours


5. Acute intervention needed for co  occurring medical disorder


6. Acute intervention needed for co  occurring psychiatric disorder


7. Severe withdrawal that cannot be handled at a lower level of care (continued


    vomiting, continued diarrhea, abnormal vital signs) requiring intravenous


    medication and/or fluids


8. Pregnancy








Admitting History and Physical





- Smoking History


Smoking history: Smoker current status UNK


Have you smoked in the past 12 months: No


Aproximately how many cigarettes per day: 1


If you are a former smoker, when did you quit?: 2013





- Alcohol/Substance Use


Hx Alcohol Use: Yes





Admission ROS [facility]





- HPI


Allergies/Adverse Reactions: 


 Allergies











Allergy/AdvReac Type Severity Reaction Status Date / Time


 


No Known Allergies Allergy   Verified 19 17:39











Exam Limitations: Intoxication





- Ebola screening


Have you traveled outside of the country in the last 21 days: No (N)


Have you had contact with anyone from an Ebola affected area: No


Do you have a fever: No





- Review of Systems


Constitutional: See HPI, Loss of Appetite


EENT: reports: Other (reading glasses , denies dysphagia)


Respiratory: reports: No Symptoms reported


Cardiac: reports: No Symptoms Reported


GI: reports: Poor Appetite


: reports: No Symptoms Reported


Musculoskeletal: reports: Joint Pain (r  shoulder  2/2  old surgical 

intervention)


Integumentary: reports: No Symptoms Reported


Neuro: reports: Tremors, Unsteady Gait


Endocrine: reports: No Symptoms Reported


Hematology: reports: No Symptoms Reported


Psychiatric: reports: Orientated x3, Agitated, Anxious





Patient History





- Patient Medical History


Hx Anemia: No


Hx Asthma: No


Hx Chronic Obstructive Pulmonary Disease (COPD): No


Hx Cancer: No


Hx Cardiac Disorders: No


Hx Congestive Heart Failure: No


Hx Hypertension: No


Hx Hypercholesterolemia: No


Hx Pacemaker: No


HX Cerebrovascular Accident: No


Hx Seizures: No


Hx Dementia: No


Hx Diabetes: No


Hx Gastrointestinal Disorders: Yes (acid reflux.)


Hx Liver Disease: No


Hx Genitourinary Disorders: No


Hx Sexually Transmitted Disorders: No


Hx Renal Disease (ESRD): No


Hx Thyroid Disease: No


Hx Human Immunodeficiency Virus (HIV): No (negative)


Hx Hepatitis C: No


Hx Depression: No


Hx Suicide Attempt: No


Hx Bipolar Disorder: No


Hx Schizophrenia: No





- Patient Surgical History


Past Surgical History: Yes


Hx Neurologic Surgery: No


Hx Cataract Extraction: No


Hx Cardiac Surgery: No


Hx Lung Surgery: No


Hx Breast Surgery: No


Hx Breast Biopsy: No


Hx Abdominal Surgery: No


Hx Appendectomy: No


Hx Cholecystectomy: No


Hx Genitourinary Surgery: No


Hx  Section: No


Hx Orthopedic Surgery: Yes (left elbow 2016)


Other Surgical History: fracture of rt shoulder sx


Anesthesia Reaction: No





- PPD History


Date: 19


Results: 0 mm





- Smoking Cessation


Smoking history: Former smoker


Have you smoked in the past 12 months: No


Aproximately how many cigarettes per day: 1


If you are a former smoker, when did you quit?: 


Hx Chewing Tobacco Use: No


Initiated information on smoking cessation: No





- Substances abused


  ** Alcohol


Substance route: Oral


Frequency: Daily


Amount used: 3 x  6pk beers


Age of first use: 15


Date of last use: 19





Admission Physical Exam [facility]





- Vital Signs


Vital Signs: 


 Vital Signs - 24 hr











  19





  17:39


 


Temperature 97.7 F


 


Pulse Rate 106 H


 


Respiratory 18





Rate 


 


Blood Pressure 127/84














- Physical


General Appearance: Yes: Moderate Distress, Alcohol on Breath, Intoxicated, 

Anxious


HEENTM: Yes: EOMI, Hearing grossly Normal, Normocephalic, Normal Voice


Respiratory: Yes: Lungs Clear, Normal Breath Sounds, No Respiratory Distress, 

No Accessory Muscle Use


Neck: Yes: No masses,lesions,Nodules, Trachea in good position


Cardiology: Yes: Regular Rhythm, Regular Rate, S1, S2, Tachycardia


Abdominal: Yes: Non Tender, Soft


Extremities: Yes: Normal Range of Motion, Non-Tender, Tremors, Other (right  FA 

surgical scar ( childhood )  , left elbow  surgical scar 4  yrs ago per pt 

[facility] ulnar nv  transposition , left elbow   edema of olecranon  

bursa)


Neurological: Yes: Motor Strength 5/5, Disoriented, Depressed Affect


Integumentary: Yes: Warm





- Diagnostic


(1) Alcohol dependence with uncomplicated withdrawal


Current Visit: Yes   Status: Chronic   





(2) Alcohol intoxication


Current Visit: Yes   Status: Acute   





(3) Cannabis dependence


Current Visit: Yes   Status: Chronic   





Breathalyzer





- Breathalyzer


Breathalyzer: 0.196





Urine Drug Screen





- Test Device


Lot number: [id number]


Expiration date: 21





- Control


Is test valid?: Yes





- Results


Drug screen NEGATIVE: No


Urine drug screen results: THC-Marijuana





Inpatient Rehab Admission





- Rehab Decision to Admit


Inpatient rehab admission?: No

## 2019-12-04 RX ADMIN — IBUPROFEN PRN MG: 400 TABLET, FILM COATED ORAL at 18:16

## 2019-12-04 RX ADMIN — IBUPROFEN PRN MG: 400 TABLET, FILM COATED ORAL at 11:24

## 2019-12-04 RX ADMIN — Medication SCH MG: at 22:07

## 2019-12-04 RX ADMIN — METHOCARBAMOL PRN MG: 500 TABLET ORAL at 10:09

## 2019-12-04 RX ADMIN — METHOCARBAMOL PRN MG: 500 TABLET ORAL at 23:03

## 2019-12-04 RX ADMIN — HYDROXYZINE PAMOATE PRN MG: 25 CAPSULE ORAL at 05:17

## 2019-12-04 RX ADMIN — METHOCARBAMOL PRN MG: 500 TABLET ORAL at 16:38

## 2019-12-04 RX ADMIN — Medication SCH TAB: at 10:08

## 2019-12-04 NOTE — PN
[facility] CIWA





- CIWA Score


Nausea/Vomitin-Mild Nausea/No Vomiting


Muscle Tremors: 3


Anxiety: 3


Agitation: 3


Paroxysmal Sweats: 3


Orientation: 0-Oriented


Tacttile Disturbances: 0-None


Auditory Disturbances: 0-None


Visual Disturbances: 0-None


Headache: 1-Very Mild


CIWA-Ar Total Score: 14





[facility] Progress Note (SOAP)


Objective: 





19 17:23


pt complaining of withdrawal Sx- on Valium. Wants librium. Called pharmacy- no 

librium available. CIWA- ~15. Will change to Ativan- d/w pharmacy and nursing

## 2019-12-04 NOTE — PN
S CIWA





- CIWA Score


Nausea/Vomitin-Mild Nausea/No Vomiting


Muscle Tremors: 3


Anxiety: 3


Agitation: 3


Paroxysmal Sweats: No Perspiration


Orientation: 0-Oriented


Tacttile Disturbances: 1-Very Mild Itch/Numbness


Auditory Disturbances: 0-None


Visual Disturbances: 0-None


Headache: 2-Mild


CIWA-Ar Total Score: 13





[facility] Progress Note (SOAP)


Subjective: 





alert,irritable,anxious,interrupted sleep,tremor,pain in the body


Objective: 





19 09:23


 Vital Signs











Temperature  99.9 F H  19 09:14


 


Pulse Rate  113 H  19 09:14


 


Respiratory Rate  18   19 09:14


 


Blood Pressure  143/86   19 09:14


 


O2 Sat by Pulse Oximetry (%)      











Assessment: 





19 09:23


withdrawal symptom


Plan: 





continue detox valium regimen

## 2019-12-05 VITALS — HEART RATE: 111 BPM | DIASTOLIC BLOOD PRESSURE: 87 MMHG | SYSTOLIC BLOOD PRESSURE: 117 MMHG | TEMPERATURE: 96.9 F

## 2019-12-05 RX ADMIN — Medication SCH TAB: at 10:49

## 2019-12-05 RX ADMIN — HYDROXYZINE PAMOATE PRN MG: 25 CAPSULE ORAL at 01:18

## 2019-12-05 RX ADMIN — IBUPROFEN PRN MG: 400 TABLET, FILM COATED ORAL at 07:27

## 2019-12-05 NOTE — PN
S CIWA





- CIWA Score


Nausea/Vomitin-Mild Nausea/No Vomiting


Muscle Tremors: 2


Anxiety: 2


Agitation: 2


Paroxysmal Sweats: No Perspiration


Orientation: 0-Oriented


Tacttile Disturbances: 1-Very Mild Itch/Numbness


Auditory Disturbances: 0-None


Visual Disturbances: 0-None


Headache: 2-Mild


CIWA-Ar Total Score: 10





[facility] Progress Note (SOAP)


Subjective: 





alert,irritable,anxious,interrupted sleep


Objective: 





19 09:19


 Vital Signs











Temperature  97.9 F   19 06:22


 


Pulse Rate  90   19 06:22


 


Respiratory Rate  18   19 06:22


 


Blood Pressure  131/94   19 06:22


 


O2 Sat by Pulse Oximetry (%)      











Assessment: 





19 09:20


withdrawal symptom


Plan: 





continue detox ativan regimen whch is chaned by Dr Perez yesterday

## 2019-12-05 NOTE — PN
[facility] Progress Note


Note: 





patient did not want to complete treatment,all attempts to convince patient to 

stay with no avail,


high risk of relapsing explained,patient understood,singed release ama,follow 

upo with medical provider at [facility],


to er if emergency problem

## 2021-07-12 ENCOUNTER — HOSPITAL ENCOUNTER (INPATIENT)
Dept: HOSPITAL 74 - YASAS | Age: 56
LOS: 2 days | Discharge: LEFT BEFORE DISCHARGE | DRG: 894 | End: 2021-07-14
Attending: ALLERGY & IMMUNOLOGY | Admitting: ALLERGY & IMMUNOLOGY
Payer: COMMERCIAL

## 2021-07-12 VITALS — BODY MASS INDEX: 25.5 KG/M2

## 2021-07-12 DIAGNOSIS — F12.20: ICD-10-CM
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
## 2021-07-12 DIAGNOSIS — G47.00: ICD-10-CM

## 2021-07-12 DIAGNOSIS — F32.9: ICD-10-CM

## 2021-07-12 DIAGNOSIS — S91.101A: ICD-10-CM

## 2021-07-12 DIAGNOSIS — K21.9: ICD-10-CM

## 2021-07-12 DIAGNOSIS — F10.230: Primary | ICD-10-CM

## 2021-07-12 DIAGNOSIS — F19.24: ICD-10-CM

## 2021-07-12 DIAGNOSIS — H04.123: ICD-10-CM

## 2021-07-12 DIAGNOSIS — F10.282: ICD-10-CM
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
## 2021-07-12 PROCEDURE — C9803 HOPD COVID-19 SPEC COLLECT: HCPCS

## 2021-07-12 PROCEDURE — U0005 INFEC AGEN DETEC AMPLI PROBE: HCPCS

## 2021-07-12 PROCEDURE — U0003 INFECTIOUS AGENT DETECTION BY NUCLEIC ACID (DNA OR RNA); SEVERE ACUTE RESPIRATORY SYNDROME CORONAVIRUS 2 (SARS-COV-2) (CORONAVIRUS DISEASE [COVID-19]), AMPLIFIED PROBE TECHNIQUE, MAKING USE OF HIGH THROUGHPUT TECHNOLOGIES AS DESCRIBED BY CMS-2020-01-R: HCPCS

## 2021-07-12 PROCEDURE — HZ2ZZZZ DETOXIFICATION SERVICES FOR SUBSTANCE ABUSE TREATMENT: ICD-10-PCS | Performed by: ALLERGY & IMMUNOLOGY

## 2021-07-12 RX ADMIN — METHOCARBAMOL PRN MG: 500 TABLET ORAL at 23:55

## 2021-07-13 LAB
ALBUMIN SERPL-MCNC: 4.4 G/DL (ref 3.4–5)
ALP SERPL-CCNC: 87 U/L (ref 45–117)
ALT SERPL-CCNC: 55 U/L (ref 13–61)
ANION GAP SERPL CALC-SCNC: 11 MMOL/L (ref 8–16)
AST SERPL-CCNC: 89 U/L (ref 15–37)
BILIRUB SERPL-MCNC: 0.9 MG/DL (ref 0.2–1)
BUN SERPL-MCNC: 6.4 MG/DL (ref 7–18)
CALCIUM SERPL-MCNC: 8.6 MG/DL (ref 8.5–10.1)
CHLORIDE SERPL-SCNC: 97 MMOL/L (ref 98–107)
CO2 SERPL-SCNC: 26 MMOL/L (ref 21–32)
CREAT SERPL-MCNC: 0.7 MG/DL (ref 0.55–1.3)
DEPRECATED RDW RBC AUTO: 18.4 % (ref 11.9–15.9)
GLUCOSE SERPL-MCNC: 101 MG/DL (ref 74–106)
HCT VFR BLD CALC: 40.5 % (ref 35.4–49)
HGB BLD-MCNC: 13.6 GM/DL (ref 11.7–16.9)
MCH RBC QN AUTO: 28 PG (ref 25.7–33.7)
MCHC RBC AUTO-ENTMCNC: 33.5 G/DL (ref 32–35.9)
MCV RBC: 83.6 FL (ref 80–96)
PLATELET # BLD AUTO: 108 10^3/UL (ref 134–434)
PMV BLD: 9.5 FL (ref 7.5–11.1)
PROT SERPL-MCNC: 8.2 G/DL (ref 6.4–8.2)
RBC # BLD AUTO: 4.84 M/MM3 (ref 4–5.6)
SODIUM SERPL-SCNC: 135 MMOL/L (ref 136–145)
WBC # BLD AUTO: 6.3 K/MM3 (ref 4–10)

## 2021-07-13 RX ADMIN — POLYVINYL ALCOHOL PRN DROP: 14 SOLUTION/ DROPS OPHTHALMIC at 17:28

## 2021-07-13 RX ADMIN — BACITRACIN ZINC SCH GM: 500 OINTMENT TOPICAL at 22:17

## 2021-07-13 RX ADMIN — METHOCARBAMOL PRN MG: 500 TABLET ORAL at 05:09

## 2021-07-13 RX ADMIN — SULFAMETHOXAZOLE AND TRIMETHOPRIM SCH EACH: 800; 160 TABLET ORAL at 10:28

## 2021-07-13 RX ADMIN — IBUPROFEN PRN MG: 400 TABLET, FILM COATED ORAL at 05:09

## 2021-07-13 RX ADMIN — POLYVINYL ALCOHOL PRN DROP: 14 SOLUTION/ DROPS OPHTHALMIC at 22:16

## 2021-07-13 RX ADMIN — BACITRACIN ZINC SCH GM: 500 OINTMENT TOPICAL at 10:26

## 2021-07-13 RX ADMIN — SULFAMETHOXAZOLE AND TRIMETHOPRIM SCH EACH: 800; 160 TABLET ORAL at 22:16

## 2021-07-13 RX ADMIN — POLYVINYL ALCOHOL PRN DROP: 14 SOLUTION/ DROPS OPHTHALMIC at 14:50

## 2021-07-14 VITALS — SYSTOLIC BLOOD PRESSURE: 103 MMHG | TEMPERATURE: 96.9 F | HEART RATE: 106 BPM | DIASTOLIC BLOOD PRESSURE: 75 MMHG

## 2021-07-14 RX ADMIN — IBUPROFEN PRN MG: 400 TABLET, FILM COATED ORAL at 06:02

## 2021-07-14 RX ADMIN — METHOCARBAMOL PRN MG: 500 TABLET ORAL at 06:02

## 2021-10-12 ENCOUNTER — HOSPITAL ENCOUNTER (INPATIENT)
Dept: HOSPITAL 74 - YASAS | Age: 56
LOS: 4 days | Discharge: HOME | DRG: 897 | End: 2021-10-16
Attending: ALLERGY & IMMUNOLOGY | Admitting: ALLERGY & IMMUNOLOGY
Payer: COMMERCIAL

## 2021-10-12 VITALS — BODY MASS INDEX: 22.5 KG/M2

## 2021-10-12 DIAGNOSIS — Z56.0: ICD-10-CM

## 2021-10-12 DIAGNOSIS — Z59.00: ICD-10-CM

## 2021-10-12 DIAGNOSIS — G47.00: ICD-10-CM

## 2021-10-12 DIAGNOSIS — F12.20: ICD-10-CM

## 2021-10-12 DIAGNOSIS — Z48.02: ICD-10-CM

## 2021-10-12 DIAGNOSIS — K21.9: ICD-10-CM

## 2021-10-12 DIAGNOSIS — F17.210: ICD-10-CM

## 2021-10-12 DIAGNOSIS — F10.230: Primary | ICD-10-CM

## 2021-10-12 DIAGNOSIS — F19.24: ICD-10-CM

## 2021-10-12 LAB
ALBUMIN SERPL-MCNC: 4.2 G/DL (ref 3.4–5)
ALP SERPL-CCNC: 88 U/L (ref 45–117)
ALT SERPL-CCNC: 90 U/L (ref 13–61)
ANION GAP SERPL CALC-SCNC: 9 MMOL/L (ref 8–16)
AST SERPL-CCNC: 121 U/L (ref 15–37)
BILIRUB SERPL-MCNC: 0.9 MG/DL (ref 0.2–1)
BUN SERPL-MCNC: 5.2 MG/DL (ref 7–18)
CALCIUM SERPL-MCNC: 9 MG/DL (ref 8.5–10.1)
CHLORIDE SERPL-SCNC: 100 MMOL/L (ref 98–107)
CO2 SERPL-SCNC: 30 MMOL/L (ref 21–32)
CREAT SERPL-MCNC: 0.6 MG/DL (ref 0.55–1.3)
DEPRECATED RDW RBC AUTO: 19.3 % (ref 11.9–15.9)
GLUCOSE SERPL-MCNC: 77 MG/DL (ref 74–106)
HCT VFR BLD CALC: 41 % (ref 35.4–49)
HGB BLD-MCNC: 13.7 GM/DL (ref 11.7–16.9)
MCH RBC QN AUTO: 30 PG (ref 25.7–33.7)
MCHC RBC AUTO-ENTMCNC: 33.3 G/DL (ref 32–35.9)
MCV RBC: 89.9 FL (ref 80–96)
PLATELET # BLD AUTO: 83 10^3/UL (ref 134–434)
PMV BLD: 9.9 FL (ref 7.5–11.1)
PROT SERPL-MCNC: 8.1 G/DL (ref 6.4–8.2)
RBC # BLD AUTO: 4.56 M/MM3 (ref 4–5.6)
SODIUM SERPL-SCNC: 139 MMOL/L (ref 136–145)
WBC # BLD AUTO: 5 K/MM3 (ref 4–10)

## 2021-10-12 PROCEDURE — HZ2ZZZZ DETOXIFICATION SERVICES FOR SUBSTANCE ABUSE TREATMENT: ICD-10-PCS | Performed by: ALLERGY & IMMUNOLOGY

## 2021-10-12 PROCEDURE — G0008 ADMIN INFLUENZA VIRUS VAC: HCPCS

## 2021-10-12 PROCEDURE — U0005 INFEC AGEN DETEC AMPLI PROBE: HCPCS

## 2021-10-12 PROCEDURE — U0003 INFECTIOUS AGENT DETECTION BY NUCLEIC ACID (DNA OR RNA); SEVERE ACUTE RESPIRATORY SYNDROME CORONAVIRUS 2 (SARS-COV-2) (CORONAVIRUS DISEASE [COVID-19]), AMPLIFIED PROBE TECHNIQUE, MAKING USE OF HIGH THROUGHPUT TECHNOLOGIES AS DESCRIBED BY CMS-2020-01-R: HCPCS

## 2021-10-12 PROCEDURE — C9803 HOPD COVID-19 SPEC COLLECT: HCPCS

## 2021-10-12 RX ADMIN — Medication SCH MG: at 22:22

## 2021-10-12 RX ADMIN — METHOCARBAMOL PRN MG: 500 TABLET ORAL at 22:21

## 2021-10-12 RX ADMIN — Medication SCH MG: at 22:21

## 2021-10-12 RX ADMIN — Medication SCH TAB: at 13:05

## 2021-10-12 SDOH — ECONOMIC STABILITY - INCOME SECURITY: UNEMPLOYMENT, UNSPECIFIED: Z56.0

## 2021-10-12 SDOH — ECONOMIC STABILITY - HOUSING INSECURITY: HOMELESSNESS UNSPECIFIED: Z59.00

## 2021-10-13 RX ADMIN — Medication SCH TAB: at 10:08

## 2021-10-13 RX ADMIN — Medication SCH MG: at 22:10

## 2021-10-13 RX ADMIN — HYDROXYZINE PAMOATE PRN MG: 25 CAPSULE ORAL at 12:02

## 2021-10-13 RX ADMIN — METHOCARBAMOL PRN MG: 500 TABLET ORAL at 17:21

## 2021-10-13 RX ADMIN — METHOCARBAMOL PRN MG: 500 TABLET ORAL at 12:04

## 2021-10-13 RX ADMIN — POLYVINYL ALCOHOL SCH DROP: 14 SOLUTION/ DROPS OPHTHALMIC at 21:08

## 2021-10-13 RX ADMIN — HYDROXYZINE PAMOATE PRN MG: 25 CAPSULE ORAL at 17:21

## 2021-10-13 RX ADMIN — POLYVINYL ALCOHOL SCH DROP: 14 SOLUTION/ DROPS OPHTHALMIC at 13:33

## 2021-10-14 LAB
ALT SERPL-CCNC: 137 U/L (ref 13–61)
AST SERPL-CCNC: 172 U/L (ref 15–37)
DEPRECATED RDW RBC AUTO: 19 % (ref 11.9–15.9)
HCT VFR BLD CALC: 43.5 % (ref 35.4–49)
HGB BLD-MCNC: 14.4 GM/DL (ref 11.7–16.9)
INR BLD: 0.9 (ref 0.83–1.09)
MCH RBC QN AUTO: 30 PG (ref 25.7–33.7)
MCHC RBC AUTO-ENTMCNC: 33.2 G/DL (ref 32–35.9)
MCV RBC: 90.4 FL (ref 80–96)
PLATELET # BLD AUTO: 103 10^3/UL (ref 134–434)
PMV BLD: 9.7 FL (ref 7.5–11.1)
PT PNL PPP: 10.5 SEC (ref 9.7–13)
RBC # BLD AUTO: 4.81 M/MM3 (ref 4–5.6)
WBC # BLD AUTO: 4.7 K/MM3 (ref 4–10)

## 2021-10-14 RX ADMIN — Medication SCH MG: at 21:09

## 2021-10-14 RX ADMIN — METHOCARBAMOL PRN MG: 500 TABLET ORAL at 10:17

## 2021-10-14 RX ADMIN — POLYVINYL ALCOHOL SCH DROP: 14 SOLUTION/ DROPS OPHTHALMIC at 21:10

## 2021-10-14 RX ADMIN — IBUPROFEN PRN MG: 400 TABLET, FILM COATED ORAL at 08:35

## 2021-10-14 RX ADMIN — HYDROXYZINE PAMOATE PRN MG: 25 CAPSULE ORAL at 22:13

## 2021-10-14 RX ADMIN — POLYVINYL ALCOHOL SCH DROP: 14 SOLUTION/ DROPS OPHTHALMIC at 14:42

## 2021-10-14 RX ADMIN — POLYVINYL ALCOHOL SCH DROP: 14 SOLUTION/ DROPS OPHTHALMIC at 05:00

## 2021-10-14 RX ADMIN — Medication SCH TAB: at 10:18

## 2021-10-14 RX ADMIN — HYDROXYZINE PAMOATE PRN MG: 25 CAPSULE ORAL at 10:19

## 2021-10-15 RX ADMIN — IBUPROFEN PRN MG: 400 TABLET, FILM COATED ORAL at 18:33

## 2021-10-15 RX ADMIN — IBUPROFEN PRN MG: 400 TABLET, FILM COATED ORAL at 07:50

## 2021-10-15 RX ADMIN — HYDROXYZINE PAMOATE PRN MG: 25 CAPSULE ORAL at 17:35

## 2021-10-15 RX ADMIN — POLYVINYL ALCOHOL SCH DROP: 14 SOLUTION/ DROPS OPHTHALMIC at 22:48

## 2021-10-15 RX ADMIN — Medication SCH MG: at 22:03

## 2021-10-15 RX ADMIN — METHOCARBAMOL PRN MG: 500 TABLET ORAL at 10:56

## 2021-10-15 RX ADMIN — POLYVINYL ALCOHOL SCH DROP: 14 SOLUTION/ DROPS OPHTHALMIC at 13:51

## 2021-10-15 RX ADMIN — METHOCARBAMOL PRN MG: 500 TABLET ORAL at 22:04

## 2021-10-15 RX ADMIN — HYDROXYZINE PAMOATE PRN MG: 25 CAPSULE ORAL at 06:08

## 2021-10-15 RX ADMIN — POLYVINYL ALCOHOL SCH DROP: 14 SOLUTION/ DROPS OPHTHALMIC at 06:06

## 2021-10-15 RX ADMIN — Medication SCH TAB: at 10:10

## 2021-10-15 RX ADMIN — HYDROXYZINE PAMOATE PRN MG: 25 CAPSULE ORAL at 10:09

## 2021-10-16 VITALS — DIASTOLIC BLOOD PRESSURE: 83 MMHG | SYSTOLIC BLOOD PRESSURE: 114 MMHG | HEART RATE: 101 BPM | TEMPERATURE: 96.8 F

## 2021-10-16 RX ADMIN — POLYVINYL ALCOHOL SCH DROP: 14 SOLUTION/ DROPS OPHTHALMIC at 06:08

## 2021-10-16 RX ADMIN — Medication SCH TAB: at 09:29

## 2021-10-16 RX ADMIN — HYDROXYZINE PAMOATE PRN MG: 25 CAPSULE ORAL at 09:29

## 2022-01-03 ENCOUNTER — HOSPITAL ENCOUNTER (INPATIENT)
Dept: HOSPITAL 74 - YASAS | Age: 57
LOS: 1 days | Discharge: LEFT BEFORE DISCHARGE | DRG: 894 | End: 2022-01-04
Attending: ALLERGY & IMMUNOLOGY | Admitting: ALLERGY & IMMUNOLOGY
Payer: COMMERCIAL

## 2022-01-03 VITALS — BODY MASS INDEX: 25.3 KG/M2

## 2022-01-03 DIAGNOSIS — F11.10: ICD-10-CM

## 2022-01-03 DIAGNOSIS — K21.9: ICD-10-CM

## 2022-01-03 DIAGNOSIS — F19.282: ICD-10-CM

## 2022-01-03 DIAGNOSIS — F10.282: ICD-10-CM

## 2022-01-03 DIAGNOSIS — F14.20: ICD-10-CM

## 2022-01-03 DIAGNOSIS — F10.230: Primary | ICD-10-CM

## 2022-01-03 DIAGNOSIS — F12.20: ICD-10-CM

## 2022-01-03 DIAGNOSIS — F19.280: ICD-10-CM

## 2022-01-03 DIAGNOSIS — Z56.0: ICD-10-CM

## 2022-01-03 DIAGNOSIS — F34.1: ICD-10-CM

## 2022-01-03 LAB
ALBUMIN SERPL-MCNC: 3.9 G/DL (ref 3.4–5)
ALP SERPL-CCNC: 62 U/L (ref 45–117)
ALT SERPL-CCNC: 38 U/L (ref 13–61)
ANION GAP SERPL CALC-SCNC: 9 MMOL/L (ref 8–16)
AST SERPL-CCNC: 57 U/L (ref 15–37)
BILIRUB SERPL-MCNC: 0.7 MG/DL (ref 0.2–1)
BUN SERPL-MCNC: 10.8 MG/DL (ref 7–18)
CALCIUM SERPL-MCNC: 8.3 MG/DL (ref 8.5–10.1)
CHLORIDE SERPL-SCNC: 107 MMOL/L (ref 98–107)
CO2 SERPL-SCNC: 24 MMOL/L (ref 21–32)
CREAT SERPL-MCNC: 0.8 MG/DL (ref 0.55–1.3)
DEPRECATED RDW RBC AUTO: 15.9 % (ref 11.9–15.9)
GLUCOSE SERPL-MCNC: 114 MG/DL (ref 74–106)
HCT VFR BLD CALC: 38.7 % (ref 35.4–49)
HGB BLD-MCNC: 12.9 GM/DL (ref 11.7–16.9)
MCH RBC QN AUTO: 29.3 PG (ref 25.7–33.7)
MCHC RBC AUTO-ENTMCNC: 33.2 G/DL (ref 32–35.9)
MCV RBC: 88.3 FL (ref 80–96)
PLATELET # BLD AUTO: 156 10^3/UL (ref 134–434)
PMV BLD: 9.5 FL (ref 7.5–11.1)
PROT SERPL-MCNC: 7.2 G/DL (ref 6.4–8.2)
RBC # BLD AUTO: 4.39 M/MM3 (ref 4–5.6)
SODIUM SERPL-SCNC: 141 MMOL/L (ref 136–145)
WBC # BLD AUTO: 8.3 K/MM3 (ref 4–10)

## 2022-01-03 PROCEDURE — U0005 INFEC AGEN DETEC AMPLI PROBE: HCPCS

## 2022-01-03 PROCEDURE — U0003 INFECTIOUS AGENT DETECTION BY NUCLEIC ACID (DNA OR RNA); SEVERE ACUTE RESPIRATORY SYNDROME CORONAVIRUS 2 (SARS-COV-2) (CORONAVIRUS DISEASE [COVID-19]), AMPLIFIED PROBE TECHNIQUE, MAKING USE OF HIGH THROUGHPUT TECHNOLOGIES AS DESCRIBED BY CMS-2020-01-R: HCPCS

## 2022-01-03 PROCEDURE — C9803 HOPD COVID-19 SPEC COLLECT: HCPCS

## 2022-01-03 PROCEDURE — HZ2ZZZZ DETOXIFICATION SERVICES FOR SUBSTANCE ABUSE TREATMENT: ICD-10-PCS | Performed by: ALLERGY & IMMUNOLOGY

## 2022-01-03 RX ADMIN — HYDROXYZINE PAMOATE SCH: 25 CAPSULE ORAL at 13:03

## 2022-01-03 RX ADMIN — IBUPROFEN PRN MG: 400 TABLET, FILM COATED ORAL at 16:44

## 2022-01-03 RX ADMIN — Medication SCH TAB: at 13:00

## 2022-01-03 RX ADMIN — HYDROXYZINE PAMOATE SCH MG: 25 CAPSULE ORAL at 19:40

## 2022-01-03 RX ADMIN — METHOCARBAMOL PRN MG: 500 TABLET ORAL at 13:00

## 2022-01-03 RX ADMIN — HYDROXYZINE PAMOATE SCH MG: 25 CAPSULE ORAL at 23:32

## 2022-01-03 SDOH — ECONOMIC STABILITY - INCOME SECURITY: UNEMPLOYMENT, UNSPECIFIED: Z56.0

## 2022-01-04 VITALS — SYSTOLIC BLOOD PRESSURE: 120 MMHG | TEMPERATURE: 98.4 F | HEART RATE: 86 BPM | DIASTOLIC BLOOD PRESSURE: 84 MMHG

## 2022-01-04 RX ADMIN — HYDROXYZINE PAMOATE SCH MG: 25 CAPSULE ORAL at 10:21

## 2022-01-04 RX ADMIN — IBUPROFEN PRN MG: 400 TABLET, FILM COATED ORAL at 03:39

## 2022-01-04 RX ADMIN — HYDROXYZINE PAMOATE SCH MG: 25 CAPSULE ORAL at 05:32

## 2022-01-04 RX ADMIN — HYDROXYZINE PAMOATE SCH MG: 25 CAPSULE ORAL at 14:40

## 2022-01-04 RX ADMIN — METHOCARBAMOL PRN MG: 500 TABLET ORAL at 14:40

## 2022-01-04 RX ADMIN — Medication SCH TAB: at 10:20

## 2022-03-13 ENCOUNTER — HOSPITAL ENCOUNTER (INPATIENT)
Dept: HOSPITAL 74 - YASAS | Age: 57
LOS: 1 days | Discharge: LEFT BEFORE DISCHARGE | DRG: 894 | End: 2022-03-14
Attending: ALLERGY & IMMUNOLOGY | Admitting: ALLERGY & IMMUNOLOGY
Payer: COMMERCIAL

## 2022-03-13 VITALS — BODY MASS INDEX: 26.3 KG/M2

## 2022-03-13 DIAGNOSIS — F17.213: ICD-10-CM

## 2022-03-13 DIAGNOSIS — F10.230: Primary | ICD-10-CM

## 2022-03-13 DIAGNOSIS — G47.00: ICD-10-CM

## 2022-03-13 DIAGNOSIS — K21.9: ICD-10-CM

## 2022-03-13 DIAGNOSIS — F10.280: ICD-10-CM

## 2022-03-13 DIAGNOSIS — F10.24: ICD-10-CM

## 2022-03-13 DIAGNOSIS — F12.20: ICD-10-CM

## 2022-03-13 DIAGNOSIS — F10.282: ICD-10-CM

## 2022-03-13 DIAGNOSIS — Z96.611: ICD-10-CM

## 2022-03-13 PROCEDURE — U0005 INFEC AGEN DETEC AMPLI PROBE: HCPCS

## 2022-03-13 PROCEDURE — U0003 INFECTIOUS AGENT DETECTION BY NUCLEIC ACID (DNA OR RNA); SEVERE ACUTE RESPIRATORY SYNDROME CORONAVIRUS 2 (SARS-COV-2) (CORONAVIRUS DISEASE [COVID-19]), AMPLIFIED PROBE TECHNIQUE, MAKING USE OF HIGH THROUGHPUT TECHNOLOGIES AS DESCRIBED BY CMS-2020-01-R: HCPCS

## 2022-03-13 PROCEDURE — C9803 HOPD COVID-19 SPEC COLLECT: HCPCS

## 2022-03-13 PROCEDURE — HZ2ZZZZ DETOXIFICATION SERVICES FOR SUBSTANCE ABUSE TREATMENT: ICD-10-PCS | Performed by: ALLERGY & IMMUNOLOGY

## 2022-03-13 RX ADMIN — HYDROXYZINE PAMOATE SCH MG: 25 CAPSULE ORAL at 17:22

## 2022-03-13 RX ADMIN — ACETAMINOPHEN PRN MG: 325 TABLET ORAL at 21:13

## 2022-03-13 RX ADMIN — METHOCARBAMOL PRN MG: 500 TABLET ORAL at 17:22

## 2022-03-13 RX ADMIN — HYDROXYZINE PAMOATE SCH MG: 25 CAPSULE ORAL at 22:13

## 2022-03-13 RX ADMIN — IBUPROFEN PRN MG: 400 TABLET, FILM COATED ORAL at 22:13

## 2022-03-14 VITALS — TEMPERATURE: 97.6 F | DIASTOLIC BLOOD PRESSURE: 88 MMHG | SYSTOLIC BLOOD PRESSURE: 112 MMHG | HEART RATE: 108 BPM

## 2022-03-14 LAB
ALBUMIN SERPL-MCNC: 3.9 G/DL (ref 3.4–5)
ALP SERPL-CCNC: 73 U/L (ref 45–117)
ALT SERPL-CCNC: 67 U/L (ref 13–61)
ANION GAP SERPL CALC-SCNC: 6 MMOL/L (ref 8–16)
AST SERPL-CCNC: 75 U/L (ref 15–37)
BILIRUB SERPL-MCNC: 1.2 MG/DL (ref 0.2–1)
BUN SERPL-MCNC: 9.4 MG/DL (ref 7–18)
CALCIUM SERPL-MCNC: 9.2 MG/DL (ref 8.5–10.1)
CHLORIDE SERPL-SCNC: 102 MMOL/L (ref 98–107)
CO2 SERPL-SCNC: 30 MMOL/L (ref 21–32)
CREAT SERPL-MCNC: 0.7 MG/DL (ref 0.55–1.3)
DEPRECATED RDW RBC AUTO: 15.6 % (ref 11.9–15.9)
GLUCOSE SERPL-MCNC: 92 MG/DL (ref 74–106)
HCT VFR BLD CALC: 37.4 % (ref 35.4–49)
HGB BLD-MCNC: 12.9 GM/DL (ref 11.7–16.9)
MCH RBC QN AUTO: 30.1 PG (ref 25.7–33.7)
MCHC RBC AUTO-ENTMCNC: 34.6 G/DL (ref 32–35.9)
MCV RBC: 87.1 FL (ref 80–96)
PLATELET # BLD AUTO: 125 10^3/UL (ref 134–434)
PMV BLD: 8.9 FL (ref 7.5–11.1)
PROT SERPL-MCNC: 7.1 G/DL (ref 6.4–8.2)
RBC # BLD AUTO: 4.29 M/MM3 (ref 4–5.6)
SODIUM SERPL-SCNC: 137 MMOL/L (ref 136–145)
WBC # BLD AUTO: 4.8 K/MM3 (ref 4–10)

## 2022-03-14 RX ADMIN — DOCUSATE SODIUM SCH MG: 100 CAPSULE, LIQUID FILLED ORAL at 13:32

## 2022-03-14 RX ADMIN — IBUPROFEN PRN MG: 400 TABLET, FILM COATED ORAL at 05:48

## 2022-03-14 RX ADMIN — HYDROXYZINE PAMOATE SCH MG: 25 CAPSULE ORAL at 13:39

## 2022-03-14 RX ADMIN — HYDROXYZINE PAMOATE SCH MG: 25 CAPSULE ORAL at 13:32

## 2022-03-14 RX ADMIN — HYDROXYZINE PAMOATE SCH MG: 25 CAPSULE ORAL at 10:07

## 2022-03-14 RX ADMIN — ACETAMINOPHEN PRN MG: 325 TABLET ORAL at 13:39

## 2022-03-14 RX ADMIN — METHOCARBAMOL PRN MG: 500 TABLET ORAL at 08:35

## 2022-03-14 RX ADMIN — DOCUSATE SODIUM SCH MG: 100 CAPSULE, LIQUID FILLED ORAL at 13:39

## 2022-03-14 RX ADMIN — ACETAMINOPHEN PRN MG: 325 TABLET ORAL at 13:32

## 2022-03-14 RX ADMIN — HYDROXYZINE PAMOATE SCH MG: 25 CAPSULE ORAL at 06:30

## 2022-03-14 RX ADMIN — IBUPROFEN PRN MG: 400 TABLET, FILM COATED ORAL at 11:45

## 2022-04-27 ENCOUNTER — HOSPITAL ENCOUNTER (INPATIENT)
Dept: HOSPITAL 74 - YASAS | Age: 57
LOS: 1 days | Discharge: LEFT BEFORE DISCHARGE | DRG: 894 | End: 2022-04-28
Attending: ALLERGY & IMMUNOLOGY | Admitting: ALLERGY & IMMUNOLOGY
Payer: COMMERCIAL

## 2022-04-27 VITALS — BODY MASS INDEX: 24.6 KG/M2

## 2022-04-27 DIAGNOSIS — F19.24: ICD-10-CM

## 2022-04-27 DIAGNOSIS — F12.20: ICD-10-CM

## 2022-04-27 DIAGNOSIS — H04.321: ICD-10-CM

## 2022-04-27 DIAGNOSIS — F17.210: ICD-10-CM

## 2022-04-27 DIAGNOSIS — G47.00: ICD-10-CM

## 2022-04-27 DIAGNOSIS — F32.A: ICD-10-CM

## 2022-04-27 DIAGNOSIS — K21.9: ICD-10-CM

## 2022-04-27 DIAGNOSIS — F10.230: Primary | ICD-10-CM

## 2022-04-27 LAB
ALBUMIN SERPL-MCNC: 3.8 G/DL (ref 3.4–5)
ALP SERPL-CCNC: 79 U/L (ref 45–117)
ALT SERPL-CCNC: 40 U/L (ref 13–61)
ANION GAP SERPL CALC-SCNC: 9 MMOL/L (ref 8–16)
AST SERPL-CCNC: 46 U/L (ref 15–37)
BILIRUB SERPL-MCNC: 0.5 MG/DL (ref 0.2–1)
BUN SERPL-MCNC: 5.6 MG/DL (ref 7–18)
CALCIUM SERPL-MCNC: 8.5 MG/DL (ref 8.5–10.1)
CHLORIDE SERPL-SCNC: 100 MMOL/L (ref 98–107)
CO2 SERPL-SCNC: 30 MMOL/L (ref 21–32)
CREAT SERPL-MCNC: 0.7 MG/DL (ref 0.55–1.3)
DEPRECATED RDW RBC AUTO: 16.1 % (ref 11.9–15.9)
GLUCOSE SERPL-MCNC: 113 MG/DL (ref 74–106)
HCT VFR BLD CALC: 39.1 % (ref 35.4–49)
HGB BLD-MCNC: 12.9 GM/DL (ref 11.7–16.9)
MCH RBC QN AUTO: 28.8 PG (ref 25.7–33.7)
MCHC RBC AUTO-ENTMCNC: 33 G/DL (ref 32–35.9)
MCV RBC: 87.3 FL (ref 80–96)
PLATELET # BLD AUTO: 150 10^3/UL (ref 134–434)
PMV BLD: 8.8 FL (ref 7.5–11.1)
PROT SERPL-MCNC: 7.4 G/DL (ref 6.4–8.2)
RBC # BLD AUTO: 4.48 M/MM3 (ref 4–5.6)
SODIUM SERPL-SCNC: 138 MMOL/L (ref 136–145)
WBC # BLD AUTO: 7.4 K/MM3 (ref 4–10)

## 2022-04-27 PROCEDURE — U0005 INFEC AGEN DETEC AMPLI PROBE: HCPCS

## 2022-04-27 PROCEDURE — U0003 INFECTIOUS AGENT DETECTION BY NUCLEIC ACID (DNA OR RNA); SEVERE ACUTE RESPIRATORY SYNDROME CORONAVIRUS 2 (SARS-COV-2) (CORONAVIRUS DISEASE [COVID-19]), AMPLIFIED PROBE TECHNIQUE, MAKING USE OF HIGH THROUGHPUT TECHNOLOGIES AS DESCRIBED BY CMS-2020-01-R: HCPCS

## 2022-04-27 PROCEDURE — HZ2ZZZZ DETOXIFICATION SERVICES FOR SUBSTANCE ABUSE TREATMENT: ICD-10-PCS | Performed by: ALLERGY & IMMUNOLOGY

## 2022-04-27 RX ADMIN — HYDROXYZINE PAMOATE SCH MG: 25 CAPSULE ORAL at 22:11

## 2022-04-27 RX ADMIN — DOCUSATE SODIUM SCH MG: 100 CAPSULE, LIQUID FILLED ORAL at 22:11

## 2022-04-27 RX ADMIN — METHOCARBAMOL PRN MG: 500 TABLET ORAL at 18:43

## 2022-04-27 RX ADMIN — DOCUSATE SODIUM SCH MG: 100 CAPSULE, LIQUID FILLED ORAL at 15:00

## 2022-04-27 RX ADMIN — HYDROXYZINE PAMOATE SCH MG: 25 CAPSULE ORAL at 14:29

## 2022-04-27 RX ADMIN — Medication SCH TAB: at 14:24

## 2022-04-27 RX ADMIN — HYDROXYZINE PAMOATE SCH MG: 25 CAPSULE ORAL at 17:10

## 2022-04-27 RX ADMIN — IBUPROFEN PRN MG: 400 TABLET, FILM COATED ORAL at 17:11

## 2022-04-27 RX ADMIN — PANTOPRAZOLE SODIUM SCH MG: 40 TABLET, DELAYED RELEASE ORAL at 14:23

## 2022-04-28 VITALS — HEART RATE: 102 BPM | TEMPERATURE: 97.3 F | SYSTOLIC BLOOD PRESSURE: 128 MMHG | DIASTOLIC BLOOD PRESSURE: 96 MMHG

## 2022-04-28 RX ADMIN — HYDROXYZINE PAMOATE SCH MG: 25 CAPSULE ORAL at 06:16

## 2022-04-28 RX ADMIN — HYDROXYZINE PAMOATE SCH MG: 25 CAPSULE ORAL at 10:05

## 2022-04-28 RX ADMIN — METHOCARBAMOL PRN MG: 500 TABLET ORAL at 10:05

## 2022-04-28 RX ADMIN — HYDROXYZINE PAMOATE SCH MG: 25 CAPSULE ORAL at 17:46

## 2022-04-28 RX ADMIN — Medication SCH TAB: at 10:04

## 2022-04-28 RX ADMIN — IBUPROFEN PRN MG: 400 TABLET, FILM COATED ORAL at 10:04

## 2022-04-28 RX ADMIN — DOCUSATE SODIUM SCH MG: 100 CAPSULE, LIQUID FILLED ORAL at 14:41

## 2022-04-28 RX ADMIN — PANTOPRAZOLE SODIUM SCH MG: 40 TABLET, DELAYED RELEASE ORAL at 10:04

## 2022-04-28 RX ADMIN — IBUPROFEN PRN MG: 400 TABLET, FILM COATED ORAL at 03:57

## 2022-04-28 RX ADMIN — DOCUSATE SODIUM SCH MG: 100 CAPSULE, LIQUID FILLED ORAL at 06:16

## 2022-04-28 RX ADMIN — METHOCARBAMOL PRN MG: 500 TABLET ORAL at 03:57

## 2022-04-28 RX ADMIN — HYDROXYZINE PAMOATE SCH MG: 25 CAPSULE ORAL at 14:41

## 2022-05-29 ENCOUNTER — HOSPITAL ENCOUNTER (INPATIENT)
Dept: HOSPITAL 74 - YASAS | Age: 57
LOS: 2 days | Discharge: LEFT BEFORE DISCHARGE | DRG: 894 | End: 2022-05-31
Attending: SURGERY | Admitting: ALLERGY & IMMUNOLOGY
Payer: COMMERCIAL

## 2022-05-29 VITALS — BODY MASS INDEX: 26.4 KG/M2

## 2022-05-29 DIAGNOSIS — F12.20: ICD-10-CM

## 2022-05-29 DIAGNOSIS — F10.230: Primary | ICD-10-CM

## 2022-05-29 DIAGNOSIS — F10.24: ICD-10-CM

## 2022-05-29 DIAGNOSIS — F17.210: ICD-10-CM

## 2022-05-29 DIAGNOSIS — G47.00: ICD-10-CM

## 2022-05-29 DIAGNOSIS — F10.282: ICD-10-CM

## 2022-05-29 DIAGNOSIS — K21.9: ICD-10-CM

## 2022-05-29 PROCEDURE — U0005 INFEC AGEN DETEC AMPLI PROBE: HCPCS

## 2022-05-29 PROCEDURE — HZ2ZZZZ DETOXIFICATION SERVICES FOR SUBSTANCE ABUSE TREATMENT: ICD-10-PCS | Performed by: ALLERGY & IMMUNOLOGY

## 2022-05-29 PROCEDURE — U0003 INFECTIOUS AGENT DETECTION BY NUCLEIC ACID (DNA OR RNA); SEVERE ACUTE RESPIRATORY SYNDROME CORONAVIRUS 2 (SARS-COV-2) (CORONAVIRUS DISEASE [COVID-19]), AMPLIFIED PROBE TECHNIQUE, MAKING USE OF HIGH THROUGHPUT TECHNOLOGIES AS DESCRIBED BY CMS-2020-01-R: HCPCS

## 2022-05-29 RX ADMIN — METHOCARBAMOL PRN MG: 500 TABLET ORAL at 14:50

## 2022-05-29 RX ADMIN — HYDROXYZINE PAMOATE PRN MG: 25 CAPSULE ORAL at 16:48

## 2022-05-29 RX ADMIN — IBUPROFEN PRN MG: 400 TABLET, FILM COATED ORAL at 14:49

## 2022-05-29 RX ADMIN — Medication SCH MG: at 22:29

## 2022-05-29 RX ADMIN — METHOCARBAMOL PRN MG: 500 TABLET ORAL at 22:29

## 2022-05-29 RX ADMIN — Medication PRN MG: at 22:30

## 2022-05-29 RX ADMIN — HYDROXYZINE PAMOATE PRN MG: 25 CAPSULE ORAL at 18:09

## 2022-05-29 RX ADMIN — HYDROXYZINE PAMOATE PRN MG: 25 CAPSULE ORAL at 22:29

## 2022-05-30 RX ADMIN — Medication SCH MG: at 22:06

## 2022-05-30 RX ADMIN — Medication PRN MG: at 22:06

## 2022-05-30 RX ADMIN — POLYVINYL ALCOHOL SCH DROP: 14 SOLUTION/ DROPS OPHTHALMIC at 15:24

## 2022-05-30 RX ADMIN — METHOCARBAMOL PRN MG: 500 TABLET ORAL at 22:06

## 2022-05-30 RX ADMIN — POLYVINYL ALCOHOL SCH DROP: 14 SOLUTION/ DROPS OPHTHALMIC at 22:09

## 2022-05-30 RX ADMIN — HYDROXYZINE PAMOATE PRN MG: 25 CAPSULE ORAL at 22:06

## 2022-05-30 RX ADMIN — HYDROXYZINE PAMOATE PRN MG: 25 CAPSULE ORAL at 18:25

## 2022-05-31 VITALS — TEMPERATURE: 97.7 F | HEART RATE: 100 BPM

## 2022-05-31 VITALS — DIASTOLIC BLOOD PRESSURE: 88 MMHG | SYSTOLIC BLOOD PRESSURE: 113 MMHG

## 2022-05-31 RX ADMIN — IBUPROFEN PRN MG: 400 TABLET, FILM COATED ORAL at 05:41

## 2022-05-31 RX ADMIN — HYDROXYZINE PAMOATE PRN MG: 25 CAPSULE ORAL at 01:02

## 2022-05-31 RX ADMIN — POLYVINYL ALCOHOL SCH DROP: 14 SOLUTION/ DROPS OPHTHALMIC at 05:40

## 2022-05-31 RX ADMIN — IBUPROFEN PRN MG: 400 TABLET, FILM COATED ORAL at 05:20

## 2022-05-31 RX ADMIN — HYDROXYZINE PAMOATE PRN MG: 25 CAPSULE ORAL at 04:17

## 2022-08-18 ENCOUNTER — HOSPITAL ENCOUNTER (INPATIENT)
Dept: HOSPITAL 74 - YASAS | Age: 57
LOS: 2 days | Discharge: LEFT BEFORE DISCHARGE | DRG: 894 | End: 2022-08-20
Attending: SURGERY | Admitting: ALLERGY & IMMUNOLOGY
Payer: COMMERCIAL

## 2022-08-18 VITALS — BODY MASS INDEX: 25.8 KG/M2

## 2022-08-18 DIAGNOSIS — Z87.891: ICD-10-CM

## 2022-08-18 DIAGNOSIS — F12.20: ICD-10-CM

## 2022-08-18 DIAGNOSIS — F10.230: Primary | ICD-10-CM

## 2022-08-18 DIAGNOSIS — F10.282: ICD-10-CM

## 2022-08-18 DIAGNOSIS — G47.00: ICD-10-CM

## 2022-08-18 DIAGNOSIS — F32.A: ICD-10-CM

## 2022-08-18 DIAGNOSIS — K21.9: ICD-10-CM

## 2022-08-18 DIAGNOSIS — F10.24: ICD-10-CM

## 2022-08-18 DIAGNOSIS — Z99.89: ICD-10-CM

## 2022-08-18 PROCEDURE — HZ2ZZZZ DETOXIFICATION SERVICES FOR SUBSTANCE ABUSE TREATMENT: ICD-10-PCS | Performed by: SURGERY

## 2022-08-18 PROCEDURE — U0005 INFEC AGEN DETEC AMPLI PROBE: HCPCS

## 2022-08-18 PROCEDURE — U0003 INFECTIOUS AGENT DETECTION BY NUCLEIC ACID (DNA OR RNA); SEVERE ACUTE RESPIRATORY SYNDROME CORONAVIRUS 2 (SARS-COV-2) (CORONAVIRUS DISEASE [COVID-19]), AMPLIFIED PROBE TECHNIQUE, MAKING USE OF HIGH THROUGHPUT TECHNOLOGIES AS DESCRIBED BY CMS-2020-01-R: HCPCS

## 2022-08-18 RX ADMIN — METHOCARBAMOL PRN MG: 500 TABLET ORAL at 17:02

## 2022-08-18 RX ADMIN — HYDROXYZINE PAMOATE SCH MG: 25 CAPSULE ORAL at 22:26

## 2022-08-18 RX ADMIN — Medication SCH MG: at 22:26

## 2022-08-18 RX ADMIN — Medication SCH MG: at 22:28

## 2022-08-18 RX ADMIN — TUBERCULIN PURIFIED PROTEIN DERIVATIVE ONE: 5 INJECTION, SOLUTION INTRADERMAL at 15:52

## 2022-08-18 RX ADMIN — Medication SCH TAB: at 15:53

## 2022-08-18 RX ADMIN — TUBERCULIN PURIFIED PROTEIN DERIVATIVE ONE ML: 5 INJECTION, SOLUTION INTRADERMAL at 17:02

## 2022-08-18 RX ADMIN — NICOTINE SCH: 14 PATCH, EXTENDED RELEASE TRANSDERMAL at 15:52

## 2022-08-18 RX ADMIN — HYDROXYZINE PAMOATE SCH MG: 25 CAPSULE ORAL at 17:02

## 2022-08-19 LAB
ALBUMIN SERPL-MCNC: 4 G/DL (ref 3.4–5)
ALP SERPL-CCNC: 86 U/L (ref 45–117)
ALT SERPL-CCNC: 47 U/L (ref 13–61)
ANION GAP SERPL CALC-SCNC: 11 MMOL/L (ref 8–16)
AST SERPL-CCNC: 57 U/L (ref 15–37)
BILIRUB SERPL-MCNC: 1.1 MG/DL (ref 0.2–1)
BUN SERPL-MCNC: 10.1 MG/DL (ref 7–18)
CALCIUM SERPL-MCNC: 8.8 MG/DL (ref 8.5–10.1)
CHLORIDE SERPL-SCNC: 96 MMOL/L (ref 98–107)
CO2 SERPL-SCNC: 30 MMOL/L (ref 21–32)
CREAT SERPL-MCNC: 0.7 MG/DL (ref 0.55–1.3)
DEPRECATED RDW RBC AUTO: 19.2 % (ref 11.9–15.9)
GLUCOSE SERPL-MCNC: 102 MG/DL (ref 74–106)
HCT VFR BLD CALC: 42.3 % (ref 35.4–49)
HGB BLD-MCNC: 13.8 GM/DL (ref 11.7–16.9)
MCH RBC QN AUTO: 27.8 PG (ref 25.7–33.7)
MCHC RBC AUTO-ENTMCNC: 32.7 G/DL (ref 32–35.9)
MCV RBC: 85.1 FL (ref 80–96)
PLATELET # BLD AUTO: 126 10^3/UL (ref 134–434)
PMV BLD: 9.1 FL (ref 7.5–11.1)
PROT SERPL-MCNC: 7.7 G/DL (ref 6.4–8.2)
RBC # BLD AUTO: 4.97 M/MM3 (ref 4–5.6)
SODIUM SERPL-SCNC: 137 MMOL/L (ref 136–145)
WBC # BLD AUTO: 5.4 K/MM3 (ref 4–10)

## 2022-08-19 RX ADMIN — HYDROXYZINE PAMOATE SCH MG: 25 CAPSULE ORAL at 10:14

## 2022-08-19 RX ADMIN — Medication SCH TAB: at 10:15

## 2022-08-19 RX ADMIN — HYDROXYZINE PAMOATE SCH MG: 25 CAPSULE ORAL at 22:15

## 2022-08-19 RX ADMIN — POLYVINYL ALCOHOL PRN DROP: 14 SOLUTION/ DROPS OPHTHALMIC at 17:25

## 2022-08-19 RX ADMIN — Medication SCH MG: at 22:14

## 2022-08-19 RX ADMIN — IBUPROFEN PRN MG: 600 TABLET, FILM COATED ORAL at 08:44

## 2022-08-19 RX ADMIN — METHOCARBAMOL PRN MG: 500 TABLET ORAL at 17:37

## 2022-08-19 RX ADMIN — IBUPROFEN PRN MG: 600 TABLET, FILM COATED ORAL at 16:28

## 2022-08-19 RX ADMIN — HYDROXYZINE PAMOATE SCH MG: 25 CAPSULE ORAL at 05:34

## 2022-08-19 RX ADMIN — METHOCARBAMOL PRN MG: 500 TABLET ORAL at 10:14

## 2022-08-19 RX ADMIN — HYDROXYZINE PAMOATE SCH MG: 25 CAPSULE ORAL at 13:21

## 2022-08-19 RX ADMIN — Medication SCH MG: at 22:13

## 2022-08-19 RX ADMIN — HYDROXYZINE PAMOATE SCH MG: 25 CAPSULE ORAL at 17:00

## 2022-08-19 RX ADMIN — NICOTINE SCH: 14 PATCH, EXTENDED RELEASE TRANSDERMAL at 10:12

## 2022-08-19 RX ADMIN — Medication SCH: at 23:45

## 2022-08-19 RX ADMIN — POLYVINYL ALCOHOL PRN DROP: 14 SOLUTION/ DROPS OPHTHALMIC at 12:32

## 2022-08-20 VITALS
SYSTOLIC BLOOD PRESSURE: 140 MMHG | DIASTOLIC BLOOD PRESSURE: 85 MMHG | TEMPERATURE: 96.8 F | HEART RATE: 103 BPM | RESPIRATION RATE: 16 BRPM

## 2022-08-20 RX ADMIN — METHOCARBAMOL PRN MG: 500 TABLET ORAL at 02:37

## 2022-08-20 RX ADMIN — HYDROXYZINE PAMOATE SCH MG: 25 CAPSULE ORAL at 05:55

## 2022-08-20 RX ADMIN — IBUPROFEN PRN MG: 600 TABLET, FILM COATED ORAL at 08:45
